# Patient Record
Sex: FEMALE | Race: WHITE | NOT HISPANIC OR LATINO | Employment: OTHER | URBAN - METROPOLITAN AREA
[De-identification: names, ages, dates, MRNs, and addresses within clinical notes are randomized per-mention and may not be internally consistent; named-entity substitution may affect disease eponyms.]

---

## 2018-05-09 ENCOUNTER — APPOINTMENT (OUTPATIENT)
Dept: CT IMAGING | Facility: CLINIC | Age: 63
End: 2018-05-09
Attending: EMERGENCY MEDICINE
Payer: COMMERCIAL

## 2018-05-09 ENCOUNTER — HOSPITAL ENCOUNTER (EMERGENCY)
Facility: CLINIC | Age: 63
Discharge: HOME | End: 2018-05-10
Attending: EMERGENCY MEDICINE | Admitting: EMERGENCY MEDICINE
Payer: COMMERCIAL

## 2018-05-09 DIAGNOSIS — T50.903A: ICD-10-CM

## 2018-05-09 DIAGNOSIS — S00.83XA CONTUSION OF OTHER PART OF HEAD, INITIAL ENCOUNTER: ICD-10-CM

## 2018-05-09 LAB
ANION GAP SERPL CALCULATED.3IONS-SCNC: 8 MMOL/L (ref 3–14)
BASOPHILS # BLD AUTO: 0 10E9/L (ref 0–0.2)
BASOPHILS NFR BLD AUTO: 0.4 %
BUN SERPL-MCNC: 22 MG/DL (ref 7–30)
CALCIUM SERPL-MCNC: 8.8 MG/DL (ref 8.5–10.1)
CHLORIDE SERPL-SCNC: 113 MMOL/L (ref 94–109)
CO2 SERPL-SCNC: 22 MMOL/L (ref 20–32)
CREAT SERPL-MCNC: 1.37 MG/DL (ref 0.52–1.04)
DIFFERENTIAL METHOD BLD: ABNORMAL
EOSINOPHIL # BLD AUTO: 0.2 10E9/L (ref 0–0.7)
EOSINOPHIL NFR BLD AUTO: 3.1 %
ERYTHROCYTE [DISTWIDTH] IN BLOOD BY AUTOMATED COUNT: 12.8 % (ref 10–15)
GFR SERPL CREATININE-BSD FRML MDRD: 39 ML/MIN/1.7M2
GLUCOSE BLDC GLUCOMTR-MCNC: 112 MG/DL (ref 70–99)
GLUCOSE SERPL-MCNC: 132 MG/DL (ref 70–99)
HCT VFR BLD AUTO: 36.8 % (ref 35–47)
HGB BLD-MCNC: 12.1 G/DL (ref 11.7–15.7)
IMM GRANULOCYTES # BLD: 0 10E9/L (ref 0–0.4)
IMM GRANULOCYTES NFR BLD: 0.4 %
INR PPP: 1.1 (ref 0.86–1.14)
LYMPHOCYTES # BLD AUTO: 2.4 10E9/L (ref 0.8–5.3)
LYMPHOCYTES NFR BLD AUTO: 32.4 %
MCH RBC QN AUTO: 33.2 PG (ref 26.5–33)
MCHC RBC AUTO-ENTMCNC: 32.9 G/DL (ref 31.5–36.5)
MCV RBC AUTO: 101 FL (ref 78–100)
MONOCYTES # BLD AUTO: 0.4 10E9/L (ref 0–1.3)
MONOCYTES NFR BLD AUTO: 5.7 %
NEUTROPHILS # BLD AUTO: 4.3 10E9/L (ref 1.6–8.3)
NEUTROPHILS NFR BLD AUTO: 58 %
NRBC # BLD AUTO: 0 10*3/UL
NRBC BLD AUTO-RTO: 0 /100
PLATELET # BLD AUTO: 213 10E9/L (ref 150–450)
POTASSIUM SERPL-SCNC: 3.7 MMOL/L (ref 3.4–5.3)
RBC # BLD AUTO: 3.65 10E12/L (ref 3.8–5.2)
SODIUM SERPL-SCNC: 143 MMOL/L (ref 133–144)
WBC # BLD AUTO: 7.4 10E9/L (ref 4–11)

## 2018-05-09 PROCEDURE — 70486 CT MAXILLOFACIAL W/O DYE: CPT

## 2018-05-09 PROCEDURE — 99285 EMERGENCY DEPT VISIT HI MDM: CPT | Mod: 25

## 2018-05-09 PROCEDURE — 70450 CT HEAD/BRAIN W/O DYE: CPT

## 2018-05-09 PROCEDURE — 25000132 ZZH RX MED GY IP 250 OP 250 PS 637: Performed by: EMERGENCY MEDICINE

## 2018-05-09 PROCEDURE — 85610 PROTHROMBIN TIME: CPT | Performed by: EMERGENCY MEDICINE

## 2018-05-09 PROCEDURE — 72125 CT NECK SPINE W/O DYE: CPT

## 2018-05-09 PROCEDURE — 93005 ELECTROCARDIOGRAM TRACING: CPT

## 2018-05-09 PROCEDURE — 80048 BASIC METABOLIC PNL TOTAL CA: CPT | Performed by: EMERGENCY MEDICINE

## 2018-05-09 PROCEDURE — 00000146 ZZHCL STATISTIC GLUCOSE BY METER IP

## 2018-05-09 PROCEDURE — 85025 COMPLETE CBC W/AUTO DIFF WBC: CPT | Performed by: EMERGENCY MEDICINE

## 2018-05-09 RX ORDER — VITS A,C,E/LUTEIN/MINERALS 300MCG-200
1 TABLET ORAL DAILY
COMMUNITY

## 2018-05-09 RX ORDER — OXYCODONE AND ACETAMINOPHEN 5; 325 MG/1; MG/1
1-2 TABLET ORAL EVERY 6 HOURS PRN
Status: ON HOLD | COMMUNITY
End: 2018-10-05

## 2018-05-09 RX ORDER — GLUCOSAMINE HCL 500 MG
3000 TABLET ORAL DAILY
COMMUNITY
End: 2018-09-28

## 2018-05-09 RX ORDER — OXYCODONE HYDROCHLORIDE 5 MG/1
5 TABLET ORAL ONCE
Status: COMPLETED | OUTPATIENT
Start: 2018-05-09 | End: 2018-05-09

## 2018-05-09 RX ORDER — PROMETHAZINE HYDROCHLORIDE 12.5 MG/1
25 TABLET ORAL EVERY 6 HOURS PRN
COMMUNITY
End: 2022-08-16

## 2018-05-09 RX ADMIN — OXYCODONE HYDROCHLORIDE 5 MG: 5 TABLET ORAL at 23:39

## 2018-05-09 ASSESSMENT — ENCOUNTER SYMPTOMS
VOMITING: 0
COUGH: 0
FEVER: 0
ARTHRALGIAS: 1
ABDOMINAL PAIN: 0
HEADACHES: 1
NAUSEA: 0
DIARRHEA: 0

## 2018-05-09 NOTE — ED AVS SNAPSHOT
[facility] Emergency Department    [address]    [facility] [address]    Phone:  [phone]    Fax:  [phone]                                       Tika Martinez   MRN: [id number]    Department:  [facility] Emergency Department   Date of Visit:  5/9/2018           After Visit Summary Signature Page     I have received my discharge instructions, and my questions have been answered. I have discussed any challenges I see with this plan with the nurse or doctor.    ..........................................................................................................................................  Patient/Patient Representative Signature      ..........................................................................................................................................  Patient Representative Print Name and Relationship to Patient    ..................................................               ................................................  Date                                            Time    ..........................................................................................................................................  Reviewed by Signature/Title    ...................................................              ..............................................  Date                                                            Time

## 2018-05-09 NOTE — ED AVS SNAPSHOT
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 and show to your community pharmacist(s) and doctor(s) at your next visit.

## 2018-05-10 ENCOUNTER — APPOINTMENT (OUTPATIENT)
Dept: GENERAL RADIOLOGY | Facility: CLINIC | Age: 63
End: 2018-05-10
Attending: EMERGENCY MEDICINE
Payer: COMMERCIAL

## 2018-05-10 VITALS
BODY MASS INDEX: 24.59 KG/M2 | WEIGHT: 153 LBS | HEIGHT: 66 IN | HEART RATE: 81 BPM | RESPIRATION RATE: 16 BRPM | SYSTOLIC BLOOD PRESSURE: 98 MMHG | OXYGEN SATURATION: 98 % | DIASTOLIC BLOOD PRESSURE: 65 MMHG | TEMPERATURE: 98 F

## 2018-05-10 LAB — INTERPRETATION ECG - MUSE: NORMAL

## 2018-05-10 PROCEDURE — 71046 X-RAY EXAM CHEST 2 VIEWS: CPT

## 2018-05-10 PROCEDURE — 25000132 ZZH RX MED GY IP 250 OP 250 PS 637: Performed by: EMERGENCY MEDICINE

## 2018-05-10 PROCEDURE — 73030 X-RAY EXAM OF SHOULDER: CPT | Mod: RT

## 2018-05-10 RX ORDER — METRONIDAZOLE 500 MG/1
500 TABLET ORAL ONCE
Qty: 1 TABLET | Refills: 0 | Status: SHIPPED | OUTPATIENT
Start: 2018-05-10 | End: 2018-05-10

## 2018-05-10 RX ORDER — OXYCODONE HYDROCHLORIDE 5 MG/1
5 TABLET ORAL ONCE
Status: COMPLETED | OUTPATIENT
Start: 2018-05-10 | End: 2018-05-10

## 2018-05-10 RX ADMIN — OXYCODONE HYDROCHLORIDE 5 MG: 5 TABLET ORAL at 00:57
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
## 2018-05-10 NOTE — ED NOTES
Spoke with Abhishek NAVARRO, who said it may be a while until they can come see pt due to staffing. They will call back and update as applicable.
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
## 2018-05-10 NOTE — ED NOTES
Spoke with Abhishek NAVARRO Supervisor who stated Nallely, RN will assess when available. No ETA given.

## 2018-09-13 ENCOUNTER — HOSPITAL ENCOUNTER (OUTPATIENT)
Dept: LAB | Facility: CLINIC | Age: 63
Discharge: HOME | End: 2018-09-13
Attending: ORTHOPAEDIC SURGERY | Admitting: ORTHOPAEDIC SURGERY
Payer: COMMERCIAL

## 2018-09-13 DIAGNOSIS — Z01.812 PRE-OPERATIVE LABORATORY EXAMINATION: ICD-10-CM

## 2018-09-13 LAB
MRSA DNA SPEC QL NAA+PROBE: NEGATIVE
SPECIMEN SOURCE: NORMAL

## 2018-09-13 PROCEDURE — 87641 MR-STAPH DNA AMP PROBE: CPT | Performed by: ORTHOPAEDIC SURGERY

## 2018-09-13 PROCEDURE — 40000829 ZZHCL STATISTIC STAPH AUREUS SUSCEPT SCREEN PCR: Performed by: ORTHOPAEDIC SURGERY

## 2018-09-13 PROCEDURE — 87640 STAPH A DNA AMP PROBE: CPT | Performed by: ORTHOPAEDIC SURGERY

## 2018-09-13 PROCEDURE — 40000830 ZZHCL STATISTIC STAPH AUREUS METH RESIST SCREEN PCR: Performed by: ORTHOPAEDIC SURGERY

## 2018-09-17 RX ORDER — CYANOCOBALAMIN 1000 UG/ML
1 INJECTION, SOLUTION INTRAMUSCULAR; SUBCUTANEOUS
COMMUNITY

## 2018-09-17 RX ORDER — EPINEPHRINE 0.15 MG/.3ML
0.15 INJECTION INTRAMUSCULAR PRN
COMMUNITY

## 2018-09-17 RX ORDER — HYDROXYCHLOROQUINE SULFATE 200 MG/1
400 TABLET, FILM COATED ORAL DAILY
COMMUNITY
End: 2022-08-16

## 2018-09-28 RX ORDER — BUDESONIDE 3 MG/1
9 CAPSULE, COATED PELLETS ORAL EVERY MORNING
COMMUNITY
End: 2022-08-16

## 2018-09-28 RX ORDER — LIDOCAINE 50 MG/G
OINTMENT TOPICAL 3 TIMES DAILY PRN
COMMUNITY
End: 2022-08-16

## 2018-09-28 RX ORDER — BUPROPION HYDROCHLORIDE 300 MG/1
450 TABLET ORAL EVERY EVENING
COMMUNITY

## 2018-09-28 RX ORDER — ALBUTEROL SULFATE 90 UG/1
2 AEROSOL, METERED RESPIRATORY (INHALATION) EVERY 4 HOURS PRN
COMMUNITY

## 2018-09-28 RX ORDER — ASCORBIC ACID 500 MG
500 TABLET ORAL DAILY
COMMUNITY

## 2018-09-28 RX ORDER — FERROUS SULFATE 324(65)MG
324 TABLET, DELAYED RELEASE (ENTERIC COATED) ORAL DAILY
COMMUNITY

## 2018-09-28 RX ORDER — LIDOCAINE 50 MG/G
1 PATCH TOPICAL EVERY 24 HOURS
COMMUNITY
End: 2022-08-16

## 2018-09-28 RX ORDER — MAGNESIUM OXIDE/MAG AA CHELATE 300 MG
1 CAPSULE ORAL DAILY
COMMUNITY
End: 2022-08-16

## 2018-09-28 RX ORDER — FLUOROURACIL 50 MG/G
CREAM TOPICAL 2 TIMES DAILY
COMMUNITY

## 2018-09-28 RX ORDER — MIDODRINE HYDROCHLORIDE 5 MG/1
5 TABLET ORAL 3 TIMES DAILY
COMMUNITY

## 2018-10-01 ENCOUNTER — HOSPITAL ENCOUNTER (OUTPATIENT)
Dept: LAB | Facility: CLINIC | Age: 63
Discharge: HOME | End: 2018-10-01
Attending: FAMILY MEDICINE | Admitting: ORTHOPAEDIC SURGERY
Payer: COMMERCIAL

## 2018-10-01 DIAGNOSIS — Z01.812 PRE-OPERATIVE LABORATORY EXAMINATION: ICD-10-CM

## 2018-10-01 LAB
ABO + RH BLD: NORMAL
ABO + RH BLD: NORMAL
BLD GP AB SCN SERPL QL: NORMAL
BLOOD BANK CMNT PATIENT-IMP: NORMAL
SPECIMEN EXP DATE BLD: NORMAL

## 2018-10-01 PROCEDURE — 36415 COLL VENOUS BLD VENIPUNCTURE: CPT | Performed by: ORTHOPAEDIC SURGERY

## 2018-10-01 PROCEDURE — 86850 RBC ANTIBODY SCREEN: CPT | Performed by: ORTHOPAEDIC SURGERY

## 2018-10-01 PROCEDURE — 86901 BLOOD TYPING SEROLOGIC RH(D): CPT | Performed by: ORTHOPAEDIC SURGERY

## 2018-10-01 PROCEDURE — 86900 BLOOD TYPING SEROLOGIC ABO: CPT | Performed by: ORTHOPAEDIC SURGERY

## 2018-10-02 ENCOUNTER — ANESTHESIA EVENT (OUTPATIENT)
Dept: SURGERY | Facility: CLINIC | Age: 63
End: 2018-10-02
Payer: COMMERCIAL

## 2018-10-02 ENCOUNTER — ANESTHESIA (OUTPATIENT)
Dept: SURGERY | Facility: CLINIC | Age: 63
End: 2018-10-02
Payer: COMMERCIAL

## 2018-10-02 ENCOUNTER — APPOINTMENT (OUTPATIENT)
Dept: PHYSICAL THERAPY | Facility: CLINIC | Age: 63
End: 2018-10-02
Attending: ORTHOPAEDIC SURGERY
Payer: COMMERCIAL

## 2018-10-02 ENCOUNTER — APPOINTMENT (OUTPATIENT)
Dept: GENERAL RADIOLOGY | Facility: CLINIC | Age: 63
End: 2018-10-02
Attending: ORTHOPAEDIC SURGERY
Payer: COMMERCIAL

## 2018-10-02 ENCOUNTER — HOSPITAL ENCOUNTER (INPATIENT)
Facility: CLINIC | Age: 63
LOS: 3 days | Discharge: HOME | End: 2018-10-05
Attending: ORTHOPAEDIC SURGERY | Admitting: ORTHOPAEDIC SURGERY
Payer: COMMERCIAL

## 2018-10-02 DIAGNOSIS — Z96.652 STATUS POST TOTAL LEFT KNEE REPLACEMENT: Primary | ICD-10-CM

## 2018-10-02 PROBLEM — Z96.659 TOTAL KNEE REPLACEMENT STATUS: Status: ACTIVE | Noted: 2018-10-02

## 2018-10-02 LAB
GLUCOSE BLDC GLUCOMTR-MCNC: 124 MG/DL (ref 70–99)
GLUCOSE BLDC GLUCOMTR-MCNC: 153 MG/DL (ref 70–99)
GLUCOSE BLDC GLUCOMTR-MCNC: 92 MG/DL (ref 70–99)
GLUCOSE BLDC GLUCOMTR-MCNC: 94 MG/DL (ref 70–99)
HBA1C MFR BLD: 5.6 % (ref 0–5.6)

## 2018-10-02 PROCEDURE — 99207 ZZC CONSULT E&M CHANGED TO INITIAL LEVEL: CPT | Performed by: INTERNAL MEDICINE

## 2018-10-02 PROCEDURE — 40000306 ZZH STATISTIC PRE PROC ASSESS II: Performed by: ORTHOPAEDIC SURGERY

## 2018-10-02 PROCEDURE — 40000193 ZZH STATISTIC PT WARD VISIT

## 2018-10-02 PROCEDURE — 83036 HEMOGLOBIN GLYCOSYLATED A1C: CPT | Performed by: INTERNAL MEDICINE

## 2018-10-02 PROCEDURE — C1776 JOINT DEVICE (IMPLANTABLE): HCPCS | Performed by: ORTHOPAEDIC SURGERY

## 2018-10-02 PROCEDURE — 71000013 ZZH RECOVERY PHASE 1 LEVEL 1 EA ADDTL HR: Performed by: ORTHOPAEDIC SURGERY

## 2018-10-02 PROCEDURE — 27210794 ZZH OR GENERAL SUPPLY STERILE: Performed by: ORTHOPAEDIC SURGERY

## 2018-10-02 PROCEDURE — 12000007 ZZH R&B INTERMEDIATE

## 2018-10-02 PROCEDURE — 25000125 ZZHC RX 250: Performed by: ANESTHESIOLOGY

## 2018-10-02 PROCEDURE — 99222 1ST HOSP IP/OBS MODERATE 55: CPT | Performed by: INTERNAL MEDICINE

## 2018-10-02 PROCEDURE — 37000009 ZZH ANESTHESIA TECHNICAL FEE, EACH ADDTL 15 MIN: Performed by: ORTHOPAEDIC SURGERY

## 2018-10-02 PROCEDURE — 25000125 ZZHC RX 250: Performed by: PHYSICIAN ASSISTANT

## 2018-10-02 PROCEDURE — 25800025 ZZH RX 258: Performed by: ORTHOPAEDIC SURGERY

## 2018-10-02 PROCEDURE — 25000128 H RX IP 250 OP 636: Performed by: ANESTHESIOLOGY

## 2018-10-02 PROCEDURE — 37000008 ZZH ANESTHESIA TECHNICAL FEE, 1ST 30 MIN: Performed by: ORTHOPAEDIC SURGERY

## 2018-10-02 PROCEDURE — 40000986 XR KNEE PORT LT 1/2 VW: Mod: LT

## 2018-10-02 PROCEDURE — 25000125 ZZHC RX 250: Performed by: ORTHOPAEDIC SURGERY

## 2018-10-02 PROCEDURE — 36000063 ZZH SURGERY LEVEL 4 EA 15 ADDTL MIN: Performed by: ORTHOPAEDIC SURGERY

## 2018-10-02 PROCEDURE — 25000128 H RX IP 250 OP 636: Performed by: PHYSICIAN ASSISTANT

## 2018-10-02 PROCEDURE — 27810169 ZZH OR IMPLANT GENERAL: Performed by: ORTHOPAEDIC SURGERY

## 2018-10-02 PROCEDURE — 25000132 ZZH RX MED GY IP 250 OP 250 PS 637: Performed by: ORTHOPAEDIC SURGERY

## 2018-10-02 PROCEDURE — 36415 COLL VENOUS BLD VENIPUNCTURE: CPT | Performed by: INTERNAL MEDICINE

## 2018-10-02 PROCEDURE — 25000128 H RX IP 250 OP 636: Performed by: NURSE ANESTHETIST, CERTIFIED REGISTERED

## 2018-10-02 PROCEDURE — 0SRD0J9 REPLACEMENT OF LEFT KNEE JOINT WITH SYNTHETIC SUBSTITUTE, CEMENTED, OPEN APPROACH: ICD-10-PCS | Performed by: ORTHOPAEDIC SURGERY

## 2018-10-02 PROCEDURE — 36000093 ZZH SURGERY LEVEL 4 1ST 30 MIN: Performed by: ORTHOPAEDIC SURGERY

## 2018-10-02 PROCEDURE — 97161 PT EVAL LOW COMPLEX 20 MIN: CPT | Mod: GP

## 2018-10-02 PROCEDURE — 00000146 ZZHCL STATISTIC GLUCOSE BY METER IP

## 2018-10-02 PROCEDURE — 27110028 ZZH OR GENERAL SUPPLY NON-STERILE: Performed by: ORTHOPAEDIC SURGERY

## 2018-10-02 PROCEDURE — 25000566 ZZH SEVOFLURANE, EA 15 MIN: Performed by: ORTHOPAEDIC SURGERY

## 2018-10-02 PROCEDURE — 25000132 ZZH RX MED GY IP 250 OP 250 PS 637: Performed by: INTERNAL MEDICINE

## 2018-10-02 PROCEDURE — 25000125 ZZHC RX 250: Performed by: NURSE ANESTHETIST, CERTIFIED REGISTERED

## 2018-10-02 PROCEDURE — 25000128 H RX IP 250 OP 636: Performed by: ORTHOPAEDIC SURGERY

## 2018-10-02 PROCEDURE — 71000012 ZZH RECOVERY PHASE 1 LEVEL 1 FIRST HR: Performed by: ORTHOPAEDIC SURGERY

## 2018-10-02 DEVICE — IMP INSERT BASEPLATE TIBIAL STRK TRI 2 5521-B-200: Type: IMPLANTABLE DEVICE | Site: KNEE | Status: FUNCTIONAL

## 2018-10-02 DEVICE — IMPLANTABLE DEVICE: Type: IMPLANTABLE DEVICE | Site: KNEE | Status: FUNCTIONAL

## 2018-10-02 DEVICE — BONE CEMENT SIMPLEX FULL DOSE 6191-1-001: Type: IMPLANTABLE DEVICE | Site: KNEE | Status: FUNCTIONAL

## 2018-10-02 DEVICE — BONE CEMENT SIMPLEX W/TOBRAMYCIN 6197-9-001: Type: IMPLANTABLE DEVICE | Site: KNEE | Status: FUNCTIONAL

## 2018-10-02 RX ORDER — PANTOPRAZOLE SODIUM 20 MG/1
20 TABLET, DELAYED RELEASE ORAL
Status: DISCONTINUED | OUTPATIENT
Start: 2018-10-03 | End: 2018-10-05 | Stop reason: HOSPADM

## 2018-10-02 RX ORDER — ONDANSETRON 2 MG/ML
4 INJECTION INTRAMUSCULAR; INTRAVENOUS EVERY 30 MIN PRN
Status: DISCONTINUED | OUTPATIENT
Start: 2018-10-02 | End: 2018-10-02 | Stop reason: HOSPADM

## 2018-10-02 RX ORDER — CEFAZOLIN SODIUM 1 G/3ML
1 INJECTION, POWDER, FOR SOLUTION INTRAMUSCULAR; INTRAVENOUS SEE ADMIN INSTRUCTIONS
Status: DISCONTINUED | OUTPATIENT
Start: 2018-10-02 | End: 2018-10-02 | Stop reason: HOSPADM

## 2018-10-02 RX ORDER — AMOXICILLIN 250 MG
1 CAPSULE ORAL 2 TIMES DAILY
Status: DISCONTINUED | OUTPATIENT
Start: 2018-10-02 | End: 2018-10-05 | Stop reason: HOSPADM

## 2018-10-02 RX ORDER — MONTELUKAST SODIUM 10 MG/1
10 TABLET ORAL AT BEDTIME
Status: DISCONTINUED | OUTPATIENT
Start: 2018-10-02 | End: 2018-10-05 | Stop reason: HOSPADM

## 2018-10-02 RX ORDER — MEPERIDINE HYDROCHLORIDE 50 MG/ML
12.5 INJECTION INTRAMUSCULAR; INTRAVENOUS; SUBCUTANEOUS
Status: DISCONTINUED | OUTPATIENT
Start: 2018-10-02 | End: 2018-10-02 | Stop reason: HOSPADM

## 2018-10-02 RX ORDER — BUDESONIDE 3 MG/1
9 CAPSULE, COATED PELLETS ORAL EVERY MORNING
Status: DISCONTINUED | OUTPATIENT
Start: 2018-10-03 | End: 2018-10-05 | Stop reason: HOSPADM

## 2018-10-02 RX ORDER — FENTANYL CITRATE 50 UG/ML
25-50 INJECTION, SOLUTION INTRAMUSCULAR; INTRAVENOUS
Status: DISCONTINUED | OUTPATIENT
Start: 2018-10-02 | End: 2018-10-02 | Stop reason: HOSPADM

## 2018-10-02 RX ORDER — OXYCODONE HYDROCHLORIDE 5 MG/1
5-10 TABLET ORAL
Status: DISCONTINUED | OUTPATIENT
Start: 2018-10-02 | End: 2018-10-04

## 2018-10-02 RX ORDER — DEXAMETHASONE SODIUM PHOSPHATE 4 MG/ML
INJECTION, SOLUTION INTRA-ARTICULAR; INTRALESIONAL; INTRAMUSCULAR; INTRAVENOUS; SOFT TISSUE PRN
Status: DISCONTINUED | OUTPATIENT
Start: 2018-10-02 | End: 2018-10-02

## 2018-10-02 RX ORDER — NICOTINE POLACRILEX 4 MG
15-30 LOZENGE BUCCAL
Status: DISCONTINUED | OUTPATIENT
Start: 2018-10-02 | End: 2018-10-05 | Stop reason: HOSPADM

## 2018-10-02 RX ORDER — MULTIPLE VITAMINS W/ MINERALS TAB 9MG-400MCG
1 TAB ORAL DAILY
Status: DISCONTINUED | OUTPATIENT
Start: 2018-10-02 | End: 2018-10-05 | Stop reason: HOSPADM

## 2018-10-02 RX ORDER — AMOXICILLIN 250 MG
2 CAPSULE ORAL 2 TIMES DAILY
Status: DISCONTINUED | OUTPATIENT
Start: 2018-10-02 | End: 2018-10-05 | Stop reason: HOSPADM

## 2018-10-02 RX ORDER — HYDROMORPHONE HYDROCHLORIDE 1 MG/ML
.3-.5 INJECTION, SOLUTION INTRAMUSCULAR; INTRAVENOUS; SUBCUTANEOUS EVERY 10 MIN PRN
Status: DISCONTINUED | OUTPATIENT
Start: 2018-10-02 | End: 2018-10-02 | Stop reason: HOSPADM

## 2018-10-02 RX ORDER — EPHEDRINE SULFATE 50 MG/ML
INJECTION, SOLUTION INTRAMUSCULAR; INTRAVENOUS; SUBCUTANEOUS PRN
Status: DISCONTINUED | OUTPATIENT
Start: 2018-10-02 | End: 2018-10-02

## 2018-10-02 RX ORDER — CEFAZOLIN SODIUM 2 G/100ML
2 INJECTION, SOLUTION INTRAVENOUS EVERY 8 HOURS
Status: COMPLETED | OUTPATIENT
Start: 2018-10-02 | End: 2018-10-03

## 2018-10-02 RX ORDER — FENTANYL CITRATE 50 UG/ML
INJECTION, SOLUTION INTRAMUSCULAR; INTRAVENOUS PRN
Status: DISCONTINUED | OUTPATIENT
Start: 2018-10-02 | End: 2018-10-02

## 2018-10-02 RX ORDER — ONDANSETRON 4 MG/1
4 TABLET, ORALLY DISINTEGRATING ORAL EVERY 30 MIN PRN
Status: DISCONTINUED | OUTPATIENT
Start: 2018-10-02 | End: 2018-10-02 | Stop reason: HOSPADM

## 2018-10-02 RX ORDER — ATORVASTATIN CALCIUM 40 MG/1
40 TABLET, FILM COATED ORAL AT BEDTIME
Status: DISCONTINUED | OUTPATIENT
Start: 2018-10-02 | End: 2018-10-05 | Stop reason: HOSPADM

## 2018-10-02 RX ORDER — GLYCOPYRROLATE 0.2 MG/ML
INJECTION, SOLUTION INTRAMUSCULAR; INTRAVENOUS PRN
Status: DISCONTINUED | OUTPATIENT
Start: 2018-10-02 | End: 2018-10-02

## 2018-10-02 RX ORDER — DEXTROSE MONOHYDRATE 25 G/50ML
25-50 INJECTION, SOLUTION INTRAVENOUS
Status: DISCONTINUED | OUTPATIENT
Start: 2018-10-02 | End: 2018-10-05 | Stop reason: HOSPADM

## 2018-10-02 RX ORDER — NALOXONE HYDROCHLORIDE 0.4 MG/ML
.1-.4 INJECTION, SOLUTION INTRAMUSCULAR; INTRAVENOUS; SUBCUTANEOUS
Status: DISCONTINUED | OUTPATIENT
Start: 2018-10-02 | End: 2018-10-02 | Stop reason: HOSPADM

## 2018-10-02 RX ORDER — MIRTAZAPINE 15 MG/1
45 TABLET, FILM COATED ORAL AT BEDTIME
Status: DISCONTINUED | OUTPATIENT
Start: 2018-10-02 | End: 2018-10-05 | Stop reason: HOSPADM

## 2018-10-02 RX ORDER — MIDODRINE HYDROCHLORIDE 5 MG/1
5 TABLET ORAL 3 TIMES DAILY
Status: DISCONTINUED | OUTPATIENT
Start: 2018-10-02 | End: 2018-10-05 | Stop reason: HOSPADM

## 2018-10-02 RX ORDER — SODIUM CHLORIDE, SODIUM LACTATE, POTASSIUM CHLORIDE, CALCIUM CHLORIDE 600; 310; 30; 20 MG/100ML; MG/100ML; MG/100ML; MG/100ML
INJECTION, SOLUTION INTRAVENOUS CONTINUOUS
Status: DISCONTINUED | OUTPATIENT
Start: 2018-10-02 | End: 2018-10-05 | Stop reason: HOSPADM

## 2018-10-02 RX ORDER — ACETAMINOPHEN 325 MG/1
975 TABLET ORAL EVERY 8 HOURS
Status: COMPLETED | OUTPATIENT
Start: 2018-10-02 | End: 2018-10-05

## 2018-10-02 RX ORDER — ALLOPURINOL 100 MG/1
200 TABLET ORAL AT BEDTIME
Status: DISCONTINUED | OUTPATIENT
Start: 2018-10-02 | End: 2018-10-05 | Stop reason: HOSPADM

## 2018-10-02 RX ORDER — PROPOFOL 10 MG/ML
INJECTION, EMULSION INTRAVENOUS CONTINUOUS PRN
Status: DISCONTINUED | OUTPATIENT
Start: 2018-10-02 | End: 2018-10-02

## 2018-10-02 RX ORDER — SCOLOPAMINE TRANSDERMAL SYSTEM 1 MG/1
1 PATCH, EXTENDED RELEASE TRANSDERMAL
Status: DISCONTINUED | OUTPATIENT
Start: 2018-10-02 | End: 2018-10-02 | Stop reason: HOSPADM

## 2018-10-02 RX ORDER — DEXTROSE MONOHYDRATE 25 G/50ML
25-50 INJECTION, SOLUTION INTRAVENOUS
Status: DISCONTINUED | OUTPATIENT
Start: 2018-10-02 | End: 2018-10-03

## 2018-10-02 RX ORDER — ONDANSETRON 2 MG/ML
4 INJECTION INTRAMUSCULAR; INTRAVENOUS EVERY 6 HOURS PRN
Status: DISCONTINUED | OUTPATIENT
Start: 2018-10-02 | End: 2018-10-05 | Stop reason: HOSPADM

## 2018-10-02 RX ORDER — ACETAMINOPHEN 325 MG/1
650 TABLET ORAL EVERY 4 HOURS PRN
Status: DISCONTINUED | OUTPATIENT
Start: 2018-10-05 | End: 2018-10-05 | Stop reason: HOSPADM

## 2018-10-02 RX ORDER — SODIUM CHLORIDE, SODIUM LACTATE, POTASSIUM CHLORIDE, CALCIUM CHLORIDE 600; 310; 30; 20 MG/100ML; MG/100ML; MG/100ML; MG/100ML
INJECTION, SOLUTION INTRAVENOUS CONTINUOUS
Status: DISCONTINUED | OUTPATIENT
Start: 2018-10-02 | End: 2018-10-02 | Stop reason: HOSPADM

## 2018-10-02 RX ORDER — ONDANSETRON 4 MG/1
4 TABLET, ORALLY DISINTEGRATING ORAL EVERY 6 HOURS PRN
Status: DISCONTINUED | OUTPATIENT
Start: 2018-10-02 | End: 2018-10-05 | Stop reason: HOSPADM

## 2018-10-02 RX ORDER — LORATADINE 10 MG/1
10 TABLET ORAL DAILY
Status: DISCONTINUED | OUTPATIENT
Start: 2018-10-02 | End: 2018-10-05 | Stop reason: HOSPADM

## 2018-10-02 RX ORDER — TOPIRAMATE 100 MG/1
200 TABLET, FILM COATED ORAL 2 TIMES DAILY
Status: DISCONTINUED | OUTPATIENT
Start: 2018-10-02 | End: 2018-10-05 | Stop reason: HOSPADM

## 2018-10-02 RX ORDER — ALBUTEROL SULFATE 0.83 MG/ML
2.5 SOLUTION RESPIRATORY (INHALATION)
Status: DISCONTINUED | OUTPATIENT
Start: 2018-10-02 | End: 2018-10-05 | Stop reason: HOSPADM

## 2018-10-02 RX ORDER — NICOTINE POLACRILEX 4 MG
15-30 LOZENGE BUCCAL
Status: DISCONTINUED | OUTPATIENT
Start: 2018-10-02 | End: 2018-10-03

## 2018-10-02 RX ORDER — BUPIVACAINE HYDROCHLORIDE AND EPINEPHRINE 2.5; 5 MG/ML; UG/ML
INJECTION, SOLUTION EPIDURAL; INFILTRATION; INTRACAUDAL; PERINEURAL PRN
Status: DISCONTINUED | OUTPATIENT
Start: 2018-10-02 | End: 2018-10-02 | Stop reason: HOSPADM

## 2018-10-02 RX ORDER — NALOXONE HYDROCHLORIDE 0.4 MG/ML
.1-.4 INJECTION, SOLUTION INTRAMUSCULAR; INTRAVENOUS; SUBCUTANEOUS
Status: DISCONTINUED | OUTPATIENT
Start: 2018-10-02 | End: 2018-10-05 | Stop reason: HOSPADM

## 2018-10-02 RX ORDER — LIDOCAINE 40 MG/G
CREAM TOPICAL
Status: DISCONTINUED | OUTPATIENT
Start: 2018-10-02 | End: 2018-10-05 | Stop reason: HOSPADM

## 2018-10-02 RX ORDER — DIMENHYDRINATE 50 MG/ML
25 INJECTION, SOLUTION INTRAMUSCULAR; INTRAVENOUS
Status: DISCONTINUED | OUTPATIENT
Start: 2018-10-02 | End: 2018-10-02 | Stop reason: HOSPADM

## 2018-10-02 RX ORDER — ALBUTEROL SULFATE 0.83 MG/ML
2.5 SOLUTION RESPIRATORY (INHALATION) EVERY 4 HOURS PRN
Status: DISCONTINUED | OUTPATIENT
Start: 2018-10-02 | End: 2018-10-02 | Stop reason: HOSPADM

## 2018-10-02 RX ORDER — ASCORBIC ACID 500 MG
500 TABLET ORAL DAILY
Status: DISCONTINUED | OUTPATIENT
Start: 2018-10-02 | End: 2018-10-05 | Stop reason: HOSPADM

## 2018-10-02 RX ORDER — ONDANSETRON 2 MG/ML
INJECTION INTRAMUSCULAR; INTRAVENOUS PRN
Status: DISCONTINUED | OUTPATIENT
Start: 2018-10-02 | End: 2018-10-02

## 2018-10-02 RX ORDER — CEFAZOLIN SODIUM 2 G/100ML
2 INJECTION, SOLUTION INTRAVENOUS
Status: COMPLETED | OUTPATIENT
Start: 2018-10-02 | End: 2018-10-02

## 2018-10-02 RX ORDER — BUPROPION HYDROCHLORIDE 150 MG/1
300 TABLET ORAL EVERY MORNING
Status: DISCONTINUED | OUTPATIENT
Start: 2018-10-03 | End: 2018-10-05 | Stop reason: HOSPADM

## 2018-10-02 RX ORDER — PROPOFOL 10 MG/ML
INJECTION, EMULSION INTRAVENOUS PRN
Status: DISCONTINUED | OUTPATIENT
Start: 2018-10-02 | End: 2018-10-02

## 2018-10-02 RX ORDER — HYDROMORPHONE HYDROCHLORIDE 1 MG/ML
.3-.5 INJECTION, SOLUTION INTRAMUSCULAR; INTRAVENOUS; SUBCUTANEOUS
Status: DISCONTINUED | OUTPATIENT
Start: 2018-10-02 | End: 2018-10-05 | Stop reason: HOSPADM

## 2018-10-02 RX ADMIN — FENTANYL CITRATE 25 MCG: 50 INJECTION INTRAMUSCULAR; INTRAVENOUS at 12:30

## 2018-10-02 RX ADMIN — SODIUM CHLORIDE, POTASSIUM CHLORIDE, SODIUM LACTATE AND CALCIUM CHLORIDE: 600; 310; 30; 20 INJECTION, SOLUTION INTRAVENOUS at 21:39

## 2018-10-02 RX ADMIN — ONDANSETRON 4 MG: 2 INJECTION INTRAMUSCULAR; INTRAVENOUS at 08:50

## 2018-10-02 RX ADMIN — MIRTAZAPINE 45 MG: 15 TABLET, FILM COATED ORAL at 21:38

## 2018-10-02 RX ADMIN — OXYCODONE HYDROCHLORIDE 10 MG: 5 TABLET ORAL at 18:14

## 2018-10-02 RX ADMIN — FENTANYL CITRATE 50 MCG: 50 INJECTION INTRAMUSCULAR; INTRAVENOUS at 09:45

## 2018-10-02 RX ADMIN — OXYCODONE HYDROCHLORIDE 5 MG: 5 TABLET ORAL at 15:22

## 2018-10-02 RX ADMIN — Medication 1 G: at 07:45

## 2018-10-02 RX ADMIN — FENTANYL CITRATE 50 MCG: 50 INJECTION INTRAMUSCULAR; INTRAVENOUS at 09:52

## 2018-10-02 RX ADMIN — Medication 0.5 MG: at 10:05

## 2018-10-02 RX ADMIN — FENTANYL CITRATE 150 MCG: 50 INJECTION, SOLUTION INTRAMUSCULAR; INTRAVENOUS at 07:32

## 2018-10-02 RX ADMIN — Medication 0.5 MG: at 16:23

## 2018-10-02 RX ADMIN — PROPOFOL 150 MG: 10 INJECTION, EMULSION INTRAVENOUS at 07:32

## 2018-10-02 RX ADMIN — SENNOSIDES AND DOCUSATE SODIUM 1 TABLET: 8.6; 5 TABLET ORAL at 20:17

## 2018-10-02 RX ADMIN — FENTANYL CITRATE 50 MCG: 50 INJECTION, SOLUTION INTRAMUSCULAR; INTRAVENOUS at 08:13

## 2018-10-02 RX ADMIN — DEXAMETHASONE SODIUM PHOSPHATE 4 MG: 4 INJECTION, SOLUTION INTRA-ARTICULAR; INTRALESIONAL; INTRAMUSCULAR; INTRAVENOUS; SOFT TISSUE at 07:32

## 2018-10-02 RX ADMIN — SODIUM CHLORIDE, POTASSIUM CHLORIDE, SODIUM LACTATE AND CALCIUM CHLORIDE: 600; 310; 30; 20 INJECTION, SOLUTION INTRAVENOUS at 13:14

## 2018-10-02 RX ADMIN — FENTANYL CITRATE 50 MCG: 50 INJECTION INTRAMUSCULAR; INTRAVENOUS at 10:42

## 2018-10-02 RX ADMIN — FENTANYL CITRATE 50 MCG: 50 INJECTION, SOLUTION INTRAMUSCULAR; INTRAVENOUS at 09:25

## 2018-10-02 RX ADMIN — SODIUM CHLORIDE, POTASSIUM CHLORIDE, SODIUM LACTATE AND CALCIUM CHLORIDE: 600; 310; 30; 20 INJECTION, SOLUTION INTRAVENOUS at 08:40

## 2018-10-02 RX ADMIN — Medication 0.5 MG: at 11:18

## 2018-10-02 RX ADMIN — Medication 0.5 MG: at 11:42

## 2018-10-02 RX ADMIN — MIDODRINE HYDROCHLORIDE 5 MG: 5 TABLET ORAL at 18:30

## 2018-10-02 RX ADMIN — TOPIRAMATE 200 MG: 100 TABLET, FILM COATED ORAL at 20:17

## 2018-10-02 RX ADMIN — ACETAMINOPHEN 650 MG: 325 TABLET, FILM COATED ORAL at 12:30

## 2018-10-02 RX ADMIN — GLYCOPYRROLATE 0.2 MG: 0.2 INJECTION, SOLUTION INTRAMUSCULAR; INTRAVENOUS at 07:32

## 2018-10-02 RX ADMIN — SODIUM CHLORIDE, POTASSIUM CHLORIDE, SODIUM LACTATE AND CALCIUM CHLORIDE: 600; 310; 30; 20 INJECTION, SOLUTION INTRAVENOUS at 06:12

## 2018-10-02 RX ADMIN — OXYCODONE HYDROCHLORIDE 5 MG: 5 TABLET ORAL at 12:30

## 2018-10-02 RX ADMIN — CEFAZOLIN SODIUM 2 G: 2 INJECTION, SOLUTION INTRAVENOUS at 07:34

## 2018-10-02 RX ADMIN — OXYCODONE HYDROCHLORIDE 10 MG: 5 TABLET ORAL at 21:38

## 2018-10-02 RX ADMIN — PROPOFOL 50 MCG/KG/MIN: 10 INJECTION, EMULSION INTRAVENOUS at 07:40

## 2018-10-02 RX ADMIN — Medication 5 MG: at 07:40

## 2018-10-02 RX ADMIN — ACETAMINOPHEN 975 MG: 325 TABLET, FILM COATED ORAL at 21:38

## 2018-10-02 RX ADMIN — FENTANYL CITRATE 50 MCG: 50 INJECTION INTRAMUSCULAR; INTRAVENOUS at 10:23

## 2018-10-02 RX ADMIN — HYDROMORPHONE HYDROCHLORIDE 1 MG: 1 INJECTION, SOLUTION INTRAMUSCULAR; INTRAVENOUS; SUBCUTANEOUS at 07:54

## 2018-10-02 RX ADMIN — ALLOPURINOL 200 MG: 100 TABLET ORAL at 21:38

## 2018-10-02 RX ADMIN — ATORVASTATIN CALCIUM 40 MG: 40 TABLET, FILM COATED ORAL at 21:38

## 2018-10-02 RX ADMIN — Medication 0.5 MG: at 10:53

## 2018-10-02 RX ADMIN — MIDAZOLAM 2 MG: 1 INJECTION INTRAMUSCULAR; INTRAVENOUS at 07:17

## 2018-10-02 RX ADMIN — CEFAZOLIN SODIUM 2 G: 2 INJECTION, SOLUTION INTRAVENOUS at 16:23

## 2018-10-02 RX ADMIN — MONTELUKAST SODIUM 10 MG: 10 TABLET, FILM COATED ORAL at 21:38

## 2018-10-02 RX ADMIN — SCOPALAMINE 1 PATCH: 1 PATCH, EXTENDED RELEASE TRANSDERMAL at 07:12

## 2018-10-02 RX ADMIN — Medication 0.5 MG: at 20:17

## 2018-10-02 ASSESSMENT — ACTIVITIES OF DAILY LIVING (ADL)
AMBULATION: 1-->ASSISTIVE EQUIPMENT
ADLS_ACUITY_SCORE: 13
RETIRED_EATING: 0-->INDEPENDENT
RETIRED_COMMUNICATION: 0-->UNDERSTANDS/COMMUNICATES WITHOUT DIFFICULTY
WHICH_OF_THE_ABOVE_FUNCTIONAL_RISKS_HAD_A_RECENT_ONSET_OR_CHANGE?: AMBULATION;TRANSFERRING
ADLS_ACUITY_SCORE: 13
TRANSFERRING: 1-->ASSISTIVE EQUIPMENT
NUMBER_OF_TIMES_PATIENT_HAS_FALLEN_WITHIN_LAST_SIX_MONTHS: 1
COGNITION: 0 - NO COGNITION ISSUES REPORTED
DRESS: 0-->INDEPENDENT
SWALLOWING: 0-->SWALLOWS FOODS/LIQUIDS WITHOUT DIFFICULTY
FALL_HISTORY_WITHIN_LAST_SIX_MONTHS: YES
TOILETING: 1-->ASSISTIVE EQUIPMENT
BATHING: 1-->ASSISTIVE EQUIPMENT

## 2018-10-02 NOTE — IP AVS SNAPSHOT
[facility]    [address] [facility] [address]    Phone:  [phone]    Fax:  [phone]                                       After Visit Summary   10/2/2018    Tika Martinez    MRN: [id number]           After Visit Summary Signature Page     I have received my discharge instructions, and my questions have been answered. I have discussed any challenges I see with this plan with the nurse or doctor.    ..........................................................................................................................................  Patient/Patient Representative Signature      ..........................................................................................................................................  Patient Representative Print Name and Relationship to Patient    ..................................................               ................................................  Date                                   Time    ..........................................................................................................................................  Reviewed by Signature/Title    ...................................................              ..............................................  Date                                               Time          22EPIC Rev 08/18

## 2018-10-02 NOTE — ANESTHESIA POSTPROCEDURE EVALUATION
Patient: Tika Martinez    Procedure(s):  left total knee arthroplasty    choice anesthesia - Wound Class: I-Clean    Diagnosis:DJD  Diagnosis Additional Information: No value filed.    Anesthesia Type:  General    Note:  Anesthesia Post Evaluation    Patient location during evaluation: PACU  Patient participation: Able to fully participate in evaluation  Level of consciousness: awake  Pain management: adequate  Airway patency: patent  Cardiovascular status: acceptable  Respiratory status: acceptable  Hydration status: acceptable  PONV: controlled     Anesthetic complications: None          Last vitals:  Vitals:    10/02/18 1005 10/02/18 1010 10/02/18 1023   BP: 140/80 138/73    Resp: 10 (!) 6 12   Temp:      SpO2: 100% 100% 100%         Electronically Signed By: Germain De La O DO  October 2, 2018  10:43 AM

## 2018-10-02 NOTE — ANESTHESIA CARE TRANSFER NOTE
Patient: Tika Martinez    Procedure(s):  left total knee arthroplasty    choice anesthesia - Wound Class: I-Clean    Diagnosis: DJD  Diagnosis Additional Information: No value filed.    Anesthesia Type:   General     Note:  Airway :Face Mask  Patient transferred to:PACU  Comments: Pt spon resp, follows commands, VSS, LMA removed without complications. Pt tx to PACU, VSS, pt comfortable. Report given to  PACU RN.Handoff Report: Identifed the Patient, Identified the Reponsible Provider, Reviewed the pertinent medical history, Discussed the surgical course, Reviewed Intra-OP anesthesia mangement and issues during anesthesia, Set expectations for post-procedure period and Allowed opportunity for questions and acknowledgement of understanding      Vitals: (Last set prior to Anesthesia Care Transfer)    CRNA VITALS  10/2/2018 0858 - 10/2/2018 0934      10/2/2018             Pulse: 117    SpO2: 99 %    Resp Rate (observed): (!)  34                Electronically Signed By: GERMAN Ashley CRNA  October 2, 2018  9:34 AM

## 2018-10-02 NOTE — ANESTHESIA PREPROCEDURE EVALUATION
PAC NOTE:       ANESTHESIA PRE EVALUATION:  Anesthesia Evaluation     .             ROS/MED HX    ENT/Pulmonary:     (+)asthma , . .    Neurologic:  - neg neurologic ROS     Cardiovascular: Comment: Long QT - neg cardiovascular ROS       METS/Exercise Tolerance:     Hematologic:  - neg hematologic  ROS       Musculoskeletal:  - neg musculoskeletal ROS       GI/Hepatic:     (+) Other GI/Hepatic (Bariatric sx)       Renal/Genitourinary:  - ROS Renal section negative   (+) chronic renal disease, type: CRI,       Endo: Comment: Insulin pump    (+) type II DM .      Psychiatric:  - neg psychiatric ROS       Infectious Disease:  - neg infectious disease ROS       Malignancy:         Other: Comment: .Lab Test        05/09/18 03/20/16                       2315 1953          WBC          7.4          7.1           HGB          12.1         11.2*         MCV          101*         99            PLT          213          293           INR          1.10         0.90           Lab Test        05/09/18 03/20/16 2315 1953          NA           143          144           POTASSIUM    3.7          3.9           CHLORIDE     113*         115*          CO2          22           24            BUN          22           13            CR           1.37*        1.27*         ANIONGAP     8            5             KEMAL          8.8          8.3*          GLC          132*         115*                              Physical Exam  Normal systems: cardiovascular and pulmonary    Airway   Mallampati: II    Dental     Cardiovascular   Rhythm and rate: regular and normal      Pulmonary    breath sounds clear to auscultation             Anesthesia Plan      History & Physical Review  History and physical reviewed and following examination; no interval change.    ASA Status:  3 .        Plan for General and Periph. Nerve Block for postop pain with Intravenous induction. Maintenance will be Inhalation  and Balanced.    PONV prophylaxis:  Ondansetron (or other 5HT-3), Scopolamine patch and Dexamethasone or Solumedrol       Postoperative Care  Postoperative pain management:  IV analgesics, Oral pain medications and Multi-modal analgesia.      Consents  Anesthetic plan, risks, benefits and alternatives discussed with:  Patient or representative..                            .

## 2018-10-02 NOTE — ANESTHESIA PROCEDURE NOTES
Peripheral nerve/Neuraxial procedure note : Femoral  Pre-Procedure  Performed by JERRICA DE LA O  Referred by MD CHILANGO  Location: pre-op    Procedure Times:10/2/2018 7:16 AM and 10/2/2018 7:27 AM  Pre-Anesthestic Checklist: patient identified, IV checked, site marked, risks and benefits discussed, informed consent, monitors and equipment checked, pre-op evaluation, at physician/surgeon's request and post-op pain management    Timeout  Correct Patient: Yes   Correct Procedure: Yes   Correct Site: Yes   Correct Laterality: Yes   Correct Position: Yes   Site Marked: Yes   .   Procedure Documentation    .    Procedure:  left  Femoral.     Ultrasound used to identify targeted nerve, plexus, or vascular marker and placed a needle adjacent to it., Ultrasound was used to visualize the spread of the anesthetic in close proximity to the above stated nerve.   Patient Prep;mask, sterile gloves, chlorhexidine gluconate and isopropyl alcohol.  Nerve Stim: Initial Level 0.6 mA. Lowest motor response mA..  Needle: insulated  Needle Length (Inches) 3.13  Insertion Method: Single Shot.       Assessment/Narrative  Paresthesias: No.  .  The placement was negative for: blood aspirated, painful injection and site bleeding.  Bolus given via needle..   Secured via.   Complications: none. Comments:  .Femoral Nerve Block    Medication- Bupivicaine 0.75% 24cc + Lido 2% w/ epi 1:200k 4cc    .The surgeon has given a verbal order transferring care of this patient to me for the performance of a regional analgesia block for post-op pain control. It is requested of me because I am uniquely trained and qualified to perform this block and the surgeon is neither trained nor qualified to perform this procedure.      TANISHA De La O

## 2018-10-02 NOTE — PLAN OF CARE
Problem: Knee Arthroplasty (Total, Partial) (Adult)  Goal: Signs and Symptoms of Listed Potential Problems Will be Absent, Minimized or Managed (Knee Arthroplasty)  Signs and symptoms of listed potential problems will be absent, minimized or managed by discharge/transition of care (reference Knee Arthroplasty (Total, Partial) (Adult) CPG).   Outcome: Improving  Pt arrived from pacu on a cart, assisted into bed with air francis. IV infusing into left f/a, drain in left knee, O2 on at 2L NC, capnography on.Pt has insulin pump on her left arm.  did bring her meter for testing. Pt rated her pain a 4-5 on arrival, with a goal of 5. Frequent VS started, oriented to room, equipment , orders and floor routines. Informed she would have PT at 1600. Pt had gotten on commode in pacu and voided. Has neuropathy on the bottoms of her feet, o/w cms is intact.

## 2018-10-02 NOTE — PROGRESS NOTES
"SPIRITUAL HEALTH SERVICES Progress Note  [facility] pre-surgical visit as requested by patient.  Patient recounted several prior surgeries, some with undesired outcomes.  One she shared was due to an error; another she experienced a great deal of pain so is requesting to \"be out\" for this surgery.  Patient also expressed hope for relief from pain and reliance on pain medication through this surgery.  Although patient was unclear at first what might help her going into surgery, talking about her past experience and what she hopes for through this surgical procedure, provided time to reflect and brought her to request prayer.   is presenting and supportive as well.  Also introduced our services as needed/desired post surgery.      Audery SIMONS) [place]  Staff   On-call  [phone]    "

## 2018-10-02 NOTE — IP AVS SNAPSHOT
MRN:[id number]                      After Visit Summary   10/2/2018    Tika Martinez    MRN: [id number]           Thank you!     Thank you for choosing [facility] for your care. Our goal is always to provide you with excellent care. Hearing back from our patients is one way we can continue to improve our services. Please take a few minutes to complete the written survey that you may receive in the mail after you visit. If you would like to speak to someone directly about your visit please contact Patient Relations at [phone]. Thank you!          Patient Information     Date Of Birth          1955        Designated Caregiver       Most Recent Value    Caregiver    Will someone help with your care after discharge? yes    Name of designated caregiver Bernabe Martinez    Phone number of caregiver     Caregiver address Same      About your hospital stay     You were admitted on:  October 2, 2018 You last received care in the:  [facility]    You were discharged on:  October 5, 2018        Reason for your hospital stay       Left total knee replacement.                  Who to Call     For medical emergencies, please call 911.  For non-urgent questions about your medical care, please call your primary care provider or clinic, None  For questions related to your surgery, please call your surgery clinic        Attending Provider     Provider Kai Paredes MD Orthopedics       Primary Care Provider    Christ Wiggins      After Care Instructions     Activity       Your activity upon discharge: activity as tolerated. Use your walker for ambulation.            Diet       Follow this diet upon discharge: Regular            Wound care and dressings       Instructions to care for your wound at home: keep wound clean and dry. Keep dressing in place until follow-up appointment.                  Follow-up Appointments     Follow-up and recommended  labs and tests        Follow-up with Miguel Estrella PA-C in 2 weeks.                  Further instructions from your care team       Return to clinic in 10-14 days.  Call [phone] to schedule or if you experience any problems before your scheduled appointment.      1. Do exercises at home as instructed by therapist twice a day. Continue outpatient therapy as ordered by your doctor.  2. Ice knee after exercises and therapy.  3. Examine dressing daily for problems.  4. May shower, can get dressing wet, no soaking (no bathtubs, pools or hot tubs)  5. Notify your dentist or physician of your implant so you can get antibiotics before any dental work or before any invasive procedure (ie: colonoscopy)  6. Remove anti-embolism stockings (Duncan hose) for 30 minutes twice daily.      Aquacel dressing:  DO NOT CHANGE DRESSING DAILY.  Leave this dressing on until follow-up appointment with Orthopedic Surgeon.  Dressing is waterproof, can shower with it on, pat dry when done.  No soaking such as in tub baths, pools or hot tubs        While on narcotic pain medication, to prevent constipation:  1. Drink plenty of water to keep well hydrated   2. May take over the counter Colace or Senna (follow instructions on label)        Call your physician if: ([phone])   1. Persistent fever greater than 101 degrees with body chills or excessive sweating.  2. Increased/persistent redness, localized warmth, tenderness, drainage or swelling at dressing site. Greater than 50% drainage on dressing.   3. Persistent pain not controlled with oral pain medications, ice and rest.   4. No bowel movement in 3 days (may use Milk of Magnesia or other over the counter remedy).  5. Chest pain, shortness of breath, and/or calf pain with excessive swelling.  6. Generalized feeling of illness such as nausea/vomitng and/or lightheaded/dizziness  7. Any other questions or concerns related to your surgery/recovery.    Thank you for allowing [facility] to participate in your cares!!    Opioid Medication Information    You have been given a prescription for an opioid (narcotic) pain medicine and/or have received a pain medicine. These medicines can make you drowsy or impaired. You must not drive, operate dangerous equipment, or engage in any other dangerous activities while taking these medications. If you drive while taking these medications, you could be arrested for DUI, or driving under the influence. Do not drink any alcohol while you are taking these medications.   Opioid pain medications can cause addiction. If you have a history of chemical dependency of any type, you are at a higher risk of becoming addicted to pain medications.  Only take these prescribed medications to treat your pain when all other options have been tried. Take it for as short a time and as few doses as possible. Store your pain pills in a secure place, as they are frequently stolen and provide a dangerous opportunity for children or visitors in your house to start abusing these powerful medications. We will not replace any lost or stolen medicine.  As soon as your pain is better, you should seek out a drug take back program (see your local police department) to dispose of them.   Over-the-counter medications and prescription drugs can pollute cohen and be harmful to humans, fish, and other wildlife when disposed of improperly -- do not flush medications down the toilet or place in the trash.  Properly disposing of medicines is important to prevent abuse or poisoning and protect the environment.     Prescription and over-the-counter medications are collected anonymously from residents for free at [facility] drop-off locations. Visit the [facility]'s Office Prescription Drug Drop-Off page for the list of drop-off sites and information about the program.       [place]  Police Department [phone] Mon-Fri  8am to 4:30pm     [place]  Police Department  [phone] 24hrs a day    [place]  Police Department [phone] Mon-Fri  8am to 6:00pm     [place]  Police Department [phone] Mon-Fri  8am to 4:30pm     [place]  Police Department [phone] 24hrs a day      [place]  Police Department [phone] Mon-Fri  8am to 4:30pm   Many prescription pain medications contain Tylenol  (acetaminophen), including Vicodin , Tylenol #3 , Norco , Lortab , and Percocet .  You should not take any extra pills of Tylenol  if you are using these prescription medications or you can get very sick.  Do not ever take more than 3000 mg of acetaminophen in any 24 hour period.  All opioids tend to cause constipation. Drink plenty of water and eat foods that have a lot of fiber, such as fruits, vegetables, prune juice, apple juice and high fiber cereal.  Take a laxative if you don t move your bowels at least every other day. Miralax , Milk of Magnesia, Colace , or Senna  can be used to keep you regular.  You will likely need to continue stool softeners and stimulants while taking opioids.     Understanding Opioid Medicines for Pain Management  Opioids are medicines that can help reduce pain. They are stronger than most over-the-counter pain relievers and must be prescribed by a healthcare provider. They can be used to treat both acute and chronic pain that ranges from moderate to severe. While opioids can be safe and effective when used correctly, they do come with serious risks and side effects. For this reason, they should only be considered as an option if other medicines or treatments have not done enough to relieve or manage pain.   What is pain?  Pain is your body s way of telling you something is wrong. It makes you pull your hand away from a flame or avoid walking on an injured leg. Pain starts in receptor cells found beneath the skin and in organs throughout the body. When you are sick or injured, these receptor cells send signals along nerve pathways  to the spinal cord, which then send the signals to the brain. The brain interprets the signals as pain. In response, it sends back signals to protect the body. The brain also releases its own natural painkillers called endorphins to help reduce pain. Once the source of the pain heals, the pain usually goes away.   Types of pain  Pain can one be of two types: acute or chronic. Both types respond to treatment.    Acute pain typically lasts fewer than 3 months. It goes away when the cause is treated. Common causes of acute pain include injury or illness. Surgery can lead to short-term pain during healing. And women experience acute pain during and after childbirth. In some cases, acute pain can lead to chronic pain over time.    Chronic pain typically lasts longer than 3 months. This includes pain that comes and goes or that is continuous. Chronic pain may be due to an ongoing health problem, such as arthritis. Or it may linger after an injury that has healed, such as a broken bone. Problems with the body s pain-control system may also lead to chronic pain. Sometimes, chronic pain can occur with no clear cause.   The pain cycle  Pain can affect all aspects of your life--for example, sleep, mood, activity, and energy level are all affected by pain. Being tired, depressed, and inactive make the pain worse and harder to cope with. This leads to a cycle of pain.  How opioids work  Opioids work by attaching to special receptors found in the brain, spinal cord, and other organs. When opioids attach to these receptors, they can block or suppress how you feel pain. Opioids can also make you feel good or relaxed by affecting areas of the brain that produce feelings of pleasure.   Types of opioids  There are two types of opioids: short-acting/immediate-release (SA/IR) and long-acting/extended-release (LA/ER). Short-acting opioids work faster than long-acting opioids but give pain relief for only short periods. Long-acting  opioids work slower than short-acting opioids but give pain-relief for longer periods. Many current opioids come in both short- and long-acting formulas. Some examples of opioids include:    Codeine with acetaminophen    Fentanyl    Hydrocodone (with or without acetaminophen)    Hydromorphone    Meperidine    Methadone    Morphine    Oxycodone (with or without acetaminophen)    Oxycodone with naloxone    Tramadol  If you are prescribed opioids, you will usually be started on a short-acting type at the lowest dosage. The dosage may then be adjusted as needed based on your response to the medicine and further follow-up with your healthcare provider. If appropriate, you may transition to using a long-acting type opioid. In some cases, you may be prescribed both types of opioids to help manage different types of pain. Any changes or adjustments will also depend on your level of pain tolerance and tolerance of side effects.     Studies show that opioids provide short-term benefits for moderate to severe pain. But the benefits of long-term use of opioids for treating pain remain unclear. In general, you should only remain on opioids if they continue to improve pain and function without increasing the risks to your health.   How opioids are given  Most opioids are taken by mouth. They often come in pill form, but some may come in the form of liquids and even sweetened lozenges. Certain opioids also may be injected under the skin, into a muscle, or into a vein. Or they may be absorbed through the skin via a patch.     Know your options  Keep in mind that opioids are not the only option for treating pain. Non-opioid options may work just as well and have fewer risks and side effects. Non-opioid options can include:     Other pain relievers, such as acetaminophen or nonsteroidal anti-inflammatory drugs (NSAIDs) such as ibuprofen or naproxen    Other classes of medicines such as anticonvulsants, antidepressants, and muscle  "relaxers     Exercise and physical therapy     Cognitive behavioral therapy, which can help you learn different ways to respond and cope with pain     Mind/body therapies such as deep breathing, distraction, visualization, meditation, or biofeedback     Complementary therapies such as massage, acupuncture and acupressure, or chiropractic care     Various procedures, such as transcutaneous electrical nerve stimulation (TENS), implantation of a spinal pump, and nerve ablation    Don't take opioids in combination with benzodiazepines. Serious risks are associated with combining opioids with benzodiazepines. These risks include extreme sleepiness, slowed breathing, and death. Let your healthcare provider know if you are taking benzodiazepines.   Date Last Reviewed: 8/1/2017 2000-2017 [employer] [address] [place] [address], [place] [address]. All rights reserved. This information is not intended as a substitute for professional medical care. Always follow your healthcare professional's instructions.                Pending Results     No orders found from 9/30/2018 to 10/3/2018.            Statement of Approval     Ordered          10/05/18 0922  I have reviewed and agree with all the recommendations and orders detailed in this document.  EFFECTIVE NOW     Approved and electronically signed by:  Jeffery Estrella PA             Admission Information     Date & Time Provider Department Dept. Phone    10/2/2018 Kai Miranda MD [facility] [phone]      Your Vitals Were     Blood Pressure Pulse Temperature Respirations Height Weight    132/59 75 98.7  F (37.1  C) 16 1.676 m (5' 6\") 70.8 kg (156 lb)    Pulse Oximetry BMI (Body Mass Index)                92% 25.18 kg/m2          Care EveryWhere ID     This is your Care EveryWhere ID. This could be used by other organizations to access your [place] medical records  [id number]        Equal Access to Services     SHABNAM SHAH AH: " Hadii armando barrono Sopeytonali, waaxda luqadaha, qaybta kaalmada seferino, lennox juan reynamihir lucasnguyen zackarylakeshia la'shainamihir bijan. [facility] [phone].    ATENCIÓN: Si evelia novoa, tiene a richard disposición servicios gratuitos de asistencia lingüística. Llame al [phone].    We comply with applicable federal civil rights laws and [place] laws. We do not discriminate on the basis of race, color, national origin, age, disability, sex, sexual orientation, or gender identity.               Review of your medicines      START taking        Dose / Directions    acetaminophen 325 MG tablet   Commonly known as:  TYLENOL        Dose:  650 mg   Take 2 tablets (650 mg) by mouth every 4 hours as needed for other (multimodal surgical pain management along with NSAIDS and opioid medication as indicated based on pain control and physical function.)   Quantity:  100 tablet   Refills:  1       oxyCODONE IR 10 MG tablet   Commonly known as:  ROXICODONE        Dose:  10-15 mg   Take 1-1.5 tablets (10-15 mg) by mouth every 3 hours as needed   Quantity:  60 tablet   Refills:  0       rivaroxaban ANTICOAGULANT 10 MG Tabs tablet   Commonly known as:  XARELTO        Dose:  10 mg   Start taking on:  10/6/2018   Take 1 tablet (10 mg) by mouth daily for 14 days   Quantity:  14 tablet   Refills:  0         CONTINUE these medicines which have NOT CHANGED        Dose / Directions    ALLOPURINOL PO        Dose:  200 mg   Take 200 mg by mouth At Bedtime   Refills:  0       ascorbic acid 500 MG tablet   Commonly known as:  VITAMIN C        Dose:  500 mg   Take 500 mg by mouth daily   Refills:  0       ATORVASTATIN CALCIUM PO        Dose:  40 mg   Take 40 mg by mouth daily   Refills:  0       budesonide 3 MG EC capsule   Commonly known as:  ENTOCORT EC        Dose:  9 mg   Take 9 mg by mouth every morning   Refills:  0       buPROPion 300 MG 24 hr tablet   Commonly known as:  WELLBUTRIN XL        Dose:  300 mg   Take 300 mg by mouth every morning    Refills:  0       calcium carb-cholecalciferol 600-500 MG-UNIT Caps   Notes to Patient:  Home schedule        Dose:  1 capsule   Take 1 capsule by mouth daily   Refills:  0       * CENTRUM SILVER PO   Notes to Patient:  Home schedule        Dose:  1 tablet   Take 1 tablet by mouth daily   Refills:  0       * multivitamin Tabs tablet        Dose:  1 tablet   Take 1 tablet by mouth daily   Refills:  0       CLARITIN PO        Dose:  10 mg   Take 10 mg by mouth daily   Refills:  0       COZAAR PO   Notes to Patient:  Home schedule        Dose:  25 mg   Take 25 mg by mouth daily   Refills:  0       cyanocobalamin 1000 MCG/ML injection   Commonly known as:  VITAMIN B12   Notes to Patient:  Home schedule        Dose:  1 mL   Inject 1 mL into the muscle every 30 days   Refills:  0       EPINEPHrine 0.15 MG/0.3ML injection 2-pack   Commonly known as:  EPIPEN JR        Dose:  0.15 mg   Inject 0.15 mg into the muscle as needed for anaphylaxis   Refills:  0       Ferrous Sulfate 324 (65 Fe) MG Tbec   Notes to Patient:  Home schedule        Dose:  324 mg   Take 324 mg by mouth daily   Refills:  0       fluorouracil 5 % cream   Commonly known as:  EFUDEX   Notes to Patient:  Home schedule        Apply topically 2 times daily   Refills:  0       glucagon 1 MG kit        Dose:  1 mg   Inject 1 mg into the muscle once   Refills:  0       HYDROMORPHONE HCL PO        Dose:  4 mg   Take 4 mg by mouth every 8 hours as needed   Refills:  0       hydroxychloroquine 200 MG tablet   Commonly known as:  PLAQUENIL   Notes to Patient:  Home schedule        Dose:  400 mg   Take 400 mg by mouth daily   Refills:  0       insulin pump infusion        Date last updated:  10/02/2018 Insulin type: Novolog, Reservoir: 2mL Omnipod [id number] BASAL RATES and times: 0000: 0.2 units/hour 0600: 0.25 units/hour 1200: 0.2 units/hour 1800: 0.25 units/hour  CARB RATIO: 1 unit:18g carbs Corection Factor (Sensitivity) and times:  1 unit per 70 mg/dL BLOOD GLUCOSE  TARGET: 120-140 mg/dL Active Insulin Time:  4 hours  Sensor:  No   Refills:  0       * lidocaine 5 % Patch   Commonly known as:  LIDODERM   Notes to Patient:  Apply at 800pm at remove at 0800am        Dose:  1 patch   Place 1 patch onto the skin every 24 hours   Refills:  0       * lidocaine 5 % ointment   Commonly known as:  XYLOCAINE        Apply topically 3 times daily as needed for moderate pain   Refills:  0       LORAZEPAM PO        Dose:  0.5 mg   Take 0.5 mg by mouth 2 times daily as needed   Refills:  0       Lysine 500 MG Caps   Notes to Patient:  Home schedule        Dose:  1 capsule   Take 1 capsule by mouth 2 times daily   Refills:  0       Magnesium 300 MG Caps   Notes to Patient:  Home schedule        Dose:  1 capsule   Take 1 capsule by mouth 2 times daily   Refills:  0       midodrine 5 MG tablet   Commonly known as:  PROAMATINE        Dose:  5 mg   Take 5 mg by mouth 3 times daily   Refills:  0       MIRTAZAPINE PO        Dose:  45 mg   Take 45 mg by mouth At Bedtime   Refills:  0       MONTELUKAST SODIUM PO        Dose:  10 mg   Take 10 mg by mouth At Bedtime   Refills:  0       NOVOLOG SC        Inject Subcutaneous 3 times daily (with meals)   Refills:  0       ONDANSETRON PO        Dose:  4 mg   Take 4 mg by mouth every 6 hours as needed   Refills:  0       PROAIR  (90 Base) MCG/ACT inhaler   Generic drug:  albuterol        Dose:  2 puff   Inhale 2 puffs into the lungs every 4 hours as needed for shortness of breath / dyspnea or wheezing   Refills:  0       PROCTOZONE-HC 2.5 % cream   Generic drug:  hydrocortisone        Place rectally 2 times daily as needed for hemorrhoids   Refills:  0       promethazine 12.5 MG tablet   Commonly known as:  PHENERGAN        Dose:  25 mg   Take 25 mg by mouth every 6 hours as needed for nausea   Refills:  0       PROTONIX PO        Dose:  20 mg   Take 20 mg by mouth every morning (before breakfast)   Refills:  0       QVAR REDIHALER 80 MCG/ACT inhaler    Generic drug:  beclomethasone HFA   Notes to Patient:  Home schedule        Dose:  1 puff   Inhale 1 puff into the lungs daily   Refills:  0       ROPINIROLE HCL PO        Dose:  1.5 mg   Take 1.5 mg by mouth daily (with lunch)   Refills:  0       TIZANIDINE HCL PO        Dose:  2 mg   Take 2 mg by mouth daily as needed   Refills:  0       TOPIRAMATE PO        Dose:  200 mg   Take 200 mg by mouth 2 times daily   Refills:  0       TRIAZOLAM PO   Notes to Patient:  Home schedule        Dose:  0.5 mg   Take 0.5 mg by mouth   Refills:  0       Vitamin D-3 5000 units Tabs   Notes to Patient:  Home schedule        Dose:  1 tablet   Take 1 tablet by mouth daily   Refills:  0       * Notice:  This list has 4 medication(s) that are the same as other medications prescribed for you. Read the directions carefully, and ask your doctor or other care provider to review them with you.      STOP taking     aspirin 81 MG tablet           oxyCODONE-acetaminophen 5-325 MG per tablet   Commonly known as:  PERCOCET                Where to get your medicines      These medications were sent to [facility] [address] [address] [address]     Phone:  [phone]     rivaroxaban ANTICOAGULANT 10 MG Tabs tablet         Some of these will need a paper prescription and others can be bought over the counter. Ask your nurse if you have questions.     Bring a paper prescription for each of these medications     acetaminophen 325 MG tablet    oxyCODONE IR 10 MG tablet                Protect others around you: Learn how to safely use, store and throw away your medicines at www.disposemymeds.org.        ANTIBIOTIC INSTRUCTION     You've Been Prescribed an Antibiotic - Now What?  Your healthcare team thinks that you or your loved one might have an infection. Some infections can be treated with antibiotics, which are powerful, life-saving drugs. Like all medications, antibiotics  have side effects and should only be used when necessary. There are some important things you should know about your antibiotic treatment.      Your healthcare team may run tests before you start taking an antibiotic.    Your team may take samples (e.g., from your blood, urine or other areas) to run tests to look for bacteria. These test can be important to determine if you need an antibiotic at all and, if you do, which antibiotic will work best.      Within a few days, your healthcare team might change or even stop your antibiotic.    Your team may start you on an antibiotic while they are working to find out what is making you sick.    Your team might change your antibiotic because test results show that a different antibiotic would be better to treat your infection.    In some cases, once your team has more information, they learn that you do not need an antibiotic at all. They may find out that you don't have an infection, or that the antibiotic you're taking won't work against your infection. For example, an infection caused by a virus can't be treated with antibiotics. Staying on an antibiotic when you don't need it is more likely to be harmful than helpful.      You may experience side effects from your antibiotic.    Like all medications, antibiotics have side effects. Some of these can be serious.    Let you healthcare team know if you have any known allergies when you are admitted to the hospital.    One significant side effect of nearly all antibiotics is the risk of severe and sometimes deadly diarrhea caused by Clostridium difficile (C. Difficile). This occurs when a person takes antibiotics because some good germs are destroyed. Antibiotic use allows C. diificile to take over, putting patients at high risk for this serious infection.    As a patient or caregiver, it is important to understand your or your loved one's antibiotic treatment. It is especially important for caregivers to speak up when  patients can't speak for themselves. Here are some important questions to ask your healthcare team.    What infection is this antibiotic treating and how do you know I have that infection?    What side effects might occur from this antibiotic?    How long will I need to take this antibiotic?    Is it safe to take this antibiotic with other medications or supplements (e.g., vitamins) that I am taking?     Are there any special directions I need to know about taking this antibiotic? For example, should I take it with food?    How will I be monitored to know whether my infection is responding to the antibiotic?    What tests may help to make sure the right antibiotic is prescribed for me?      Information provided by:  www.cdc.gov/getsmart  [place].S[place] Department of Health and Human Services  Centers for disease Control and Prevention  National Center for Emerging and Zoonotic Infectious Diseases  Division of Healthcare Quality Promotion        Information about OPIOIDS     PRESCRIPTION OPIOIDS: WHAT YOU NEED TO KNOW   We gave you an opioid (narcotic) pain medicine. It is important to manage your pain, but opioids are not always the best choice. You should first try all the other options your care team gave you. Take this medicine for as short a time (and as few doses) as possible.    Some activities can increase your pain, such as bandage changes or therapy sessions. It may help to take your pain medicine 30 to 60 minutes before these activities. Reduce your stress by getting enough sleep, working on hobbies you enjoy and practicing relaxation or meditation. Talk to your care team about ways to manage your pain beyond prescription opioids.    These medicines have risks:    DO NOT drive when on new or higher doses of pain medicine. These medicines can affect your alertness and reaction times, and you could be arrested for driving under the influence (DUI). If you need to use opioids long-term, talk to your care team about  driving.    DO NOT operate heavy machinery    DO NOT do any other dangerous activities while taking these medicines.    DO NOT drink any alcohol while taking these medicines.     If the opioid prescribed includes acetaminophen, DO NOT take with any other medicines that contain acetaminophen. Read all labels carefully. Look for the word  acetaminophen  or  Tylenol.  Ask your pharmacist if you have questions or are unsure.    You can get addicted to pain medicines, especially if you have a history of addiction (chemical, alcohol or substance dependence). Talk to your care team about ways to reduce this risk.    All opioids tend to cause constipation. Drink plenty of water and eat foods that have a lot of fiber, such as fruits, vegetables, prune juice, apple juice and high-fiber cereal. Take a laxative (Miralax, milk of magnesia, Colace, Senna) if you don t move your bowels at least every other day. Other side effects include upset stomach, sleepiness, dizziness, throwing up, tolerance (needing more of the medicine to have the same effect), physical dependence and slowed breathing.    Store your pills in a secure place, locked if possible. We will not replace any lost or stolen medicine. If you don t finish your medicine, please throw away (dispose) as directed by your pharmacist. The [place] [employer] has more information about safe disposal: https://www.pca.[facility].[place].us/living-green/managing-unwanted-medications             Medication List: This is a list of all your medications and when to take them. Check marks below indicate your daily home schedule. Keep this list as a reference.      Medications           Morning Afternoon Evening Bedtime As Needed    acetaminophen 325 MG tablet   Commonly known as:  TYLENOL   Take 2 tablets (650 mg) by mouth every 4 hours as needed for other (multimodal surgical pain management along with NSAIDS and opioid medication as indicated based on pain control and  physical function.)   Last time this was given:  975 mg on 10/5/2018  1:04 PM                                   ALLOPURINOL PO   Take 200 mg by mouth At Bedtime   Last time this was given:  200 mg on 10/4/2018  9:33 PM   Next Dose Due:  Friday at bedtime                                   ascorbic acid 500 MG tablet   Commonly known as:  VITAMIN C   Take 500 mg by mouth daily   Last time this was given:  500 mg on 10/5/2018  8:23 AM   Next Dose Due:  saturday                                   ATORVASTATIN CALCIUM PO   Take 40 mg by mouth daily   Last time this was given:  40 mg on 10/4/2018  9:32 PM   Next Dose Due:  Friday at bedtime                                    budesonide 3 MG EC capsule   Commonly known as:  ENTOCORT EC   Take 9 mg by mouth every morning   Last time this was given:  9 mg on 10/5/2018  8:23 AM   Next Dose Due:  saturday                                   buPROPion 300 MG 24 hr tablet   Commonly known as:  WELLBUTRIN XL   Take 300 mg by mouth every morning   Last time this was given:  300 mg on 10/5/2018  8:23 AM   Next Dose Due:  saturday                                   calcium carb-cholecalciferol 600-500 MG-UNIT Caps   Take 1 capsule by mouth daily   Notes to Patient:  Home schedule                                * CENTRUM SILVER PO   Take 1 tablet by mouth daily   Last time this was given:  1 tablet on 10/5/2018  8:22 AM   Notes to Patient:  Home schedule                                * multivitamin Tabs tablet   Take 1 tablet by mouth daily   Last time this was given:  1 tablet on 10/5/2018  8:22 AM   Next Dose Due:  saturday                                   CLARITIN PO   Take 10 mg by mouth daily   Last time this was given:  10 mg on 10/5/2018  8:22 AM   Next Dose Due:  saturday                                   COZAAR PO   Take 25 mg by mouth daily   Notes to Patient:  Home schedule                                cyanocobalamin 1000 MCG/ML injection   Commonly known as:  VITAMIN B12    Inject 1 mL into the muscle every 30 days   Notes to Patient:  Home schedule                                EPINEPHrine 0.15 MG/0.3ML injection 2-pack   Commonly known as:  EPIPEN JR   Inject 0.15 mg into the muscle as needed for anaphylaxis                                   Ferrous Sulfate 324 (65 Fe) MG Tbec   Take 324 mg by mouth daily   Notes to Patient:  Home schedule                                fluorouracil 5 % cream   Commonly known as:  EFUDEX   Apply topically 2 times daily   Notes to Patient:  Home schedule                                glucagon 1 MG kit   Inject 1 mg into the muscle once                                HYDROMORPHONE HCL PO   Take 4 mg by mouth every 8 hours as needed                                   hydroxychloroquine 200 MG tablet   Commonly known as:  PLAQUENIL   Take 400 mg by mouth daily   Notes to Patient:  Home schedule                                insulin pump infusion   Date last updated:  10/02/2018 Insulin type: Novolog, Reservoir: 2mL Omnipod [id number] BASAL RATES and times: 0000: 0.2 units/hour 0600: 0.25 units/hour 1200: 0.2 units/hour 1800: 0.25 units/hour  CARB RATIO: 1 unit:18g carbs Corection Factor (Sensitivity) and times:  1 unit per 70 mg/dL BLOOD GLUCOSE TARGET: 120-140 mg/dL Active Insulin Time:  4 hours  Sensor:  No                                * lidocaine 5 % Patch   Commonly known as:  LIDODERM   Place 1 patch onto the skin every 24 hours   Notes to Patient:  Apply at 800pm at remove at 0800am                                * lidocaine 5 % ointment   Commonly known as:  XYLOCAINE   Apply topically 3 times daily as needed for moderate pain                                   LORAZEPAM PO   Take 0.5 mg by mouth 2 times daily as needed                                   Lysine 500 MG Caps   Take 1 capsule by mouth 2 times daily   Notes to Patient:  Home schedule                                Magnesium 300 MG Caps   Take 1 capsule by mouth 2 times daily   Notes  to Patient:  Home schedule                                midodrine 5 MG tablet   Commonly known as:  PROAMATINE   Take 5 mg by mouth 3 times daily   Last time this was given:  5 mg on 10/5/2018 11:58 AM   Next Dose Due:  This evening                                         MIRTAZAPINE PO   Take 45 mg by mouth At Bedtime   Last time this was given:  45 mg on 10/4/2018  9:32 PM   Next Dose Due:  Friday at bedtime                                   MONTELUKAST SODIUM PO   Take 10 mg by mouth At Bedtime   Last time this was given:  10 mg on 10/4/2018  9:33 PM   Next Dose Due:  Friday at bedtime                                   NOVOLOG SC   Inject Subcutaneous 3 times daily (with meals)                                ONDANSETRON PO   Take 4 mg by mouth every 6 hours as needed                                   oxyCODONE IR 10 MG tablet   Commonly known as:  ROXICODONE   Take 1-1.5 tablets (10-15 mg) by mouth every 3 hours as needed   Last time this was given:  15 mg on 10/5/2018 12:58 PM   Next Dose Due:  400pm today                                   PROAIR  (90 Base) MCG/ACT inhaler   Inhale 2 puffs into the lungs every 4 hours as needed for shortness of breath / dyspnea or wheezing   Generic drug:  albuterol                                   PROCTOZONE-HC 2.5 % cream   Place rectally 2 times daily as needed for hemorrhoids   Generic drug:  hydrocortisone                                   promethazine 12.5 MG tablet   Commonly known as:  PHENERGAN   Take 25 mg by mouth every 6 hours as needed for nausea                                   PROTONIX PO   Take 20 mg by mouth every morning (before breakfast)   Last time this was given:  20 mg on 10/5/2018  6:38 AM   Next Dose Due:  saturday                                   QVAR REDIHALER 80 MCG/ACT inhaler   Inhale 1 puff into the lungs daily   Generic drug:  beclomethasone HFA   Notes to Patient:  Home schedule                                rivaroxaban ANTICOAGULANT  10 MG Tabs tablet   Commonly known as:  XARELTO   Take 1 tablet (10 mg) by mouth daily for 14 days   Start taking on:  10/6/2018   Last time this was given:  10 mg on 10/5/2018  8:23 AM   Next Dose Due:  saturday                                   ROPINIROLE HCL PO   Take 1.5 mg by mouth daily (with lunch)   Last time this was given:  1.5 mg on 10/5/2018 11:58 AM   Next Dose Due:  saturday                                   TIZANIDINE HCL PO   Take 2 mg by mouth daily as needed                                   TOPIRAMATE PO   Take 200 mg by mouth 2 times daily   Last time this was given:  200 mg on 10/5/2018  8:23 AM   Next Dose Due:  This evening                                      TRIAZOLAM PO   Take 0.5 mg by mouth   Notes to Patient:  Home schedule                                Vitamin D-3 5000 units Tabs   Take 1 tablet by mouth daily   Notes to Patient:  Home schedule                                * Notice:  This list has 4 medication(s) that are the same as other medications prescribed for you. Read the directions carefully, and ask your doctor or other care provider to review them with you.

## 2018-10-02 NOTE — PROGRESS NOTES
10/02/18 1600   Quick Adds   Type of Visit Initial PT Evaluation   Living Environment   Lives With spouse   Living Arrangements apartment   Home Accessibility tub/shower is not walk in   Number of Stairs to Enter Home 0   Number of Stairs Within Home 0   Transportation Available car;family or friend will provide   Self-Care   Current Activity Tolerance good   Regular Exercise yes   Activity/Exercise Type walking   Equipment Currently Used at Home grab bar;walker, rolling;cane, straight;raised toilet   Functional Level Prior   Ambulation 0-->independent  (Intermittent cane/walker use)   Transferring 0-->independent   Toileting 0-->independent   Bathing 0-->independent   Fall history within last six months yes   Number of times patient has fallen within last six months 1   General Information   Onset of Illness/Injury or Date of Surgery - Date 10/02/18   Referring Physician Ruben PARISH   Patient/Family Goals Statement Return home   Pertinent History of Current Problem (include personal factors and/or comorbidities that impact the POC) 63 year old female s/p L TKA.    Precautions/Limitations fall precautions   Weight-Bearing Status - LLE weight-bearing as tolerated   General Info Comments KI with Cbloc   Cognitive Status Examination   Orientation orientation to person, place and time   Level of Consciousness alert   Follows Commands and Answers Questions 100% of the time   Pain Assessment   Patient Currently in Pain Yes, see Vital Sign flowsheet  (7/10 )   Integumentary/Edema   Integumentary/Edema Comments Dressing to the L LE clean, dry and intac   Range of Motion (ROM)   ROM Comment Decreased L LE AROM seconday to high levels of pain and weakness this session.    Strength   Strength Comments Needs mod A for SLR on the L LE   Transfer Skills   Transfer Comments Due to high pain levels, not appropriate for OOB mobility    Sensory Examination   Sensory Perception Comments Baseline neuropathy in feet.    Modality  "Interventions   Planned Modality Interventions Cryotherapy   Planned Modality Interventions Comments PRN   General Therapy Interventions   Planned Therapy Interventions bed mobility training;gait training;ROM;strengthening;transfer training;home program guidelines;progressive activity/exercise   Clinical Impression   Criteria for Skilled Therapeutic Intervention yes, treatment indicated   PT Diagnosis Impaired gait   Influenced by the following impairments Pain, decreased L LE AROM/strength, impaired activity tolerance   Functional limitations due to impairments Decreased independence with functional mobility   Clinical Presentation Stable/Uncomplicated   Clinical Presentation Rationale Stable.    Clinical Decision Making (Complexity) Low complexity   Therapy Frequency` 2 times/day   Predicted Duration of Therapy Intervention (days/wks) 3 days   Anticipated Discharge Disposition Other (see comments)  (Will be updated with mobility assessment)   Risk & Benefits of therapy have been explained Yes   Patient, Family & other staff in agreement with plan of care Yes   [facility] \"6 Clicks\"   2016, Trustees of [facility], under license to [employer].  All rights reserved.   6 Clicks Short Forms Basic Mobility Inpatient Short Form   [facility]  \"6 Clicks\" V.2 Basic Mobility Inpatient Short Form   1. Turning from your back to your side while in a flat bed without using bedrails? 3 - A Little   2. Moving from lying on your back to sitting on the side of a flat bed without using bedrails? 3 - A Little   3. Moving to and from a bed to a chair (including a wheelchair)? 2 - A Lot   4. Standing up from a chair using your arms (e.g., wheelchair, or bedside chair)? 2 - A Lot   5. To walk in hospital room? 2 - A Lot   6. Climbing 3-5 steps with a railing? 1 - Total   Basic Mobility Raw Score (Score out of 24.Lower scores equate to lower levels of function) 13   Total Evaluation Time   Total " Evaluation Time (Minutes) 5

## 2018-10-02 NOTE — OP NOTE
Procedure Date: 10/02/2018      DATE OF PROCEDURE:  10/02/2018.      PREOPERATIVE DIAGNOSIS:  Left knee primary osteoarthritis.      POSTOPERATIVE DIAGNOSIS:  Left knee primary osteoarthritis.      PROCEDURE:  Left total knee arthroplasty.      SURGEON:  Kai Miranda MD      ASSISTANT:  Miguel Estrella PA-C      ANESTHESIA:  General, regional and local.      ESTIMATED BLOOD LOSS:  Less than 20 mL.      DRAINS:  One Hemovac.      COMPLICATIONS:  None apparent.      INDICATIONS FOR PROCEDURE:  Tika is a 63-year-old female with longstanding left knee pain.  She attempted conservative management including injections, medications and activity modifications, which failed to relieve her symptoms.  Based on poor quality of life, she elected for total knee arthroplasty, understanding the risks, benefits and alternatives.  This was discussed prior to the procedure.  Consent was signed today.      DESCRIPTION OF PROCEDURE:  Tika was brought to the operating room, placed supine on the operating room table.  General endotracheal anesthesia was administered after block had been established in PAR.  Her left lower extremity was prepped and draped in the usual sterile fashion for total knee replacement.  After a timeout confirming the appropriate limb, a standard midline incision was undertaken with sharp dissection down to the patella.  Medial parapatellar arthrotomy was then performed.  Some of the fat pad and bursal tissue was excised, and a small medial release was then performed.  The knee cap was easily everted and flexed without difficulty.  We removed some meniscus tissue and ACL and used our opening reamer to open up the femoral canal and used our intramedullary device to take a 5-degree valgus cut, taking 10 off distally as she had a 10-degree flexion contracture preoperatively.  The cut was made and we were pleased.  We then sized her to a 2, used our 4-in-1 cutting block, made our anterior, posterior chamfer cuts,  and we were happy with the cut, and there was no notching noted.  Once that was completed, we then turned our attention to her tibia.  We made a nice flat cut, which took roughly 2 mm off the medial side and 9 mm off the lateral side, which again was consistent with our preoperative planning.  Once that was completed, we used our soft tissue tensioning blocks and felt it was slightly tight medially with a size 9, so a more aggressive medial release was performed, and then the soft tissues balanced nicely in both flexion and extension.  We then cut the middle blocks for our posterior stabilized component, releasing the PCL, removing meniscus and posterior osteophytes.  We then did a formal trial and we were pleased as she had excellent range of motion.  Now we then cut the patella to accept a 27-mm round patella.  This left 16 mm of good bone.  Once the patellar trial was performed, she had excellent patellar tracking and no lateral release was needed.  Once that was completed, we then reamed and punched the tibia, irrigated out copiously, injected the posterior capsule, cemented the components without difficulty, did a 3-minute IrriSept irrigation at the end of procedure.  Irrigated this out copiously.  We were happy with our 9 mm poly.  This was then placed in the knee without difficulty.  It was brought through full range of motion, excellent patellar tracking and full range of motion in the operating room.  Copious irrigation was then performed.  We then placed a drain, closed in layers.  Sterile dressings were applied.  She was brought to the PACU in stable condition.  Needle and sponge counts correct.  Of note, we did use antibiotic cement because of her diabetic status.      The patient will be weightbear as tolerates, PT, OT, pain control.  Ancef 2 grams was given within an hour of procedure.  This will be discontinued within 24 hours.  She will be given Xarelto and Pneumo boots for DVT prophylaxis.          JULIO CESAR KEE MD             D: 10/02/2018   T: 10/02/2018   MT: SURI      Name:     JUSTIN STYLES   MRN:      [id number]        Account:        [id number]   :      1955           Procedure Date: 10/02/2018      Document: [id number]       cc: [facility]

## 2018-10-02 NOTE — PHARMACY-ADMISSION MEDICATION HISTORY
Med rec was done by pre-admitting nurse then reviewed by pharmacy.      Prior to Admission medications    Medication Sig Last Dose Taking? Auth Provider   albuterol (PROAIR HFA) 108 (90 Base) MCG/ACT inhaler Inhale 2 puffs into the lungs every 4 hours as needed for shortness of breath / dyspnea or wheezing 10/1/2018 at 0800 Yes Unknown, Entered By History   ALLOPURINOL PO Take 200 mg by mouth At Bedtime  10/1/2018 at 2359 Yes Reported, Patient   ascorbic acid (VITAMIN C) 500 MG tablet Take 500 mg by mouth daily 10/1/2018 at 0600 Yes Unknown, Entered By History   ASPIRIN 81 MG OR TABS 1 TABLET DAILY 9/28/2018 at Unknown time Yes Reported, Patient   ATORVASTATIN CALCIUM PO Take 40 mg by mouth daily  10/1/2018 at 2359 Yes Reported, Patient   Beclomethasone Diprop HFA (QVAR REDIHALER) 80 MCG/ACT AERB Inhale 1 puff into the lungs daily  10/1/2018 at 0800 Yes Reported, Patient   budesonide (ENTOCORT EC) 3 MG EC capsule Take 9 mg by mouth every morning 10/1/2018 at 2359 Yes Unknown, Entered By History   buPROPion (WELLBUTRIN XL) 300 MG 24 hr tablet Take 300 mg by mouth every morning 10/1/2018 at 0600 Yes Unknown, Entered By History   calcium carb-cholecalciferol 600-500 MG-UNIT CAPS Take 1 capsule by mouth daily 10/1/2018 at 1200 Yes Unknown, Entered By History   Cholecalciferol (VITAMIN D-3) 5000 units TABS Take 1 tablet by mouth daily 10/1/2018 at 0600 Yes Unknown, Entered By History   Ferrous Sulfate 324 (65 Fe) MG TBEC Take 324 mg by mouth daily 10/1/2018 at 1200 Yes Unknown, Entered By History   fluorouracil (EFUDEX) 5 % cream Apply topically 2 times daily 10/1/2018 at 0900 Yes Unknown, Entered By History   HYDROMORPHONE HCL PO Take 4 mg by mouth every 8 hours as needed  9/29/2018 at 1600 Yes Reported, Patient   hydroxychloroquine (PLAQUENIL) 200 MG tablet Take 400 mg by mouth daily 10/1/2018 at 0600 Yes Reported, Patient   Insulin Aspart (NOVOLOG SC) Inject Subcutaneous 3 times daily (with meals)  10/2/2018 at  Unknown time Yes Reported, Patient   INSULIN PUMP - OUTPATIENT Date last updated:  10/02/2018  Insulin type: Novolog, Reservoir: 2mL  Omnipod [id number]  BASAL RATES and times:  0000: 0.2 units/hour  0600: 0.25 units/hour  1200: 0.2 units/hour  1800: 0.25 units/hour    CARB RATIO: 1 unit:18g carbs  Corection Factor (Sensitivity) and times:  1 unit per 70 mg/dL  BLOOD GLUCOSE TARGET: 120-140 mg/dL  Active Insulin Time:  4 hours    Sensor:  No 10/1/2018 at Placed left arm - Currently wearing Yes Unknown, Entered By History   lidocaine (LIDODERM) 5 % Patch Place 1 patch onto the skin every 24 hours Past Week at Unknown time Yes Unknown, Entered By History   lidocaine (XYLOCAINE) 5 % ointment Apply topically 3 times daily as needed for moderate pain Past Week at Unknown time Yes Unknown, Entered By History   Loratadine (CLARITIN PO) Take 10 mg by mouth daily  10/1/2018 at 0600 Yes Reported, Patient   LORAZEPAM PO Take 0.5 mg by mouth 2 times daily as needed  Past Week at Unknown time Yes Reported, Patient   Losartan Potassium (COZAAR PO) Take 25 mg by mouth daily 9/28/2018 Yes Reported, Patient   Lysine 500 MG CAPS Take 1 capsule by mouth 2 times daily 10/1/2018 at 1200 Yes Unknown, Entered By History   Magnesium 300 MG CAPS Take 1 capsule by mouth 2 times daily 10/1/2018 at 0600 Yes Unknown, Entered By History   midodrine (PROAMATINE) 5 MG tablet Take 5 mg by mouth 3 times daily 10/2/2018 at 0445 Yes Unknown, Entered By History   MIRTAZAPINE PO Take 45 mg by mouth At Bedtime  10/1/2018 at 2359 Yes Reported, Patient   MONTELUKAST SODIUM PO Take 10 mg by mouth At Bedtime  10/1/2018 at 2359 Yes Reported, Patient   Multiple Vitamins-Minerals (CENTRUM SILVER PO) Take 1 tablet by mouth daily  10/1/2018 at 0600 Yes Reported, Patient   multivitamin (OCUVITE) TABS tablet Take 1 tablet by mouth daily 10/1/2018 at 0600 Yes Reported, Patient   oxyCODONE-acetaminophen (PERCOCET) 5-325 MG per tablet Take 1-2 tablets by mouth every 6  hours as needed for severe pain  9/30/2018 at Unknown time Yes Reported, Patient   Pantoprazole Sodium (PROTONIX PO) Take 20 mg by mouth every morning (before breakfast) 10/1/2018 at 0600 Yes Reported, Patient   promethazine (PHENERGAN) 12.5 MG tablet Take 25 mg by mouth every 6 hours as needed for nausea 9/29/2018 at 1400 Yes Reported, Patient   ROPINIROLE HCL PO Take 1.5 mg by mouth daily (with lunch) 10/1/2018 at 1200 Yes Reported, Patient   TIZANIDINE HCL PO Take 2 mg by mouth daily as needed  10/1/2018 at 1500 Yes Reported, Patient   TOPIRAMATE PO Take 200 mg by mouth 2 times daily  10/1/2018 at 1600 Yes Reported, Patient   TRIAZOLAM PO Take 0.5 mg by mouth  10/1/2018 at 2359 Yes Reported, Patient   cyanocobalamin (VITAMIN B12) 1000 MCG/ML injection Inject 1 mL into the muscle every 30 days More than a month at Unknown time  Reported, Patient   EPINEPHrine (EPIPEN JR) 0.15 MG/0.3ML injection 2-pack Inject 0.15 mg into the muscle as needed for anaphylaxis More than a month at Unknown time  Reported, Patient   glucagon 1 MG kit Inject 1 mg into the muscle once More than a month at Unknown time  Unknown, Entered By History   hydrocortisone (PROCTOZONE-HC) 2.5 % cream Place rectally 2 times daily as needed for hemorrhoids More than a month at Unknown time  Unknown, Entered By History   ONDANSETRON PO Take 4 mg by mouth every 6 hours as needed  More than a month at Unknown time  Reported, Patient

## 2018-10-02 NOTE — CONSULTS
[facility]  Hospitalist Consult note    Name: Tika Martinez      MRN: [id number]  YOB: 1955    Age: 63 year old  Date of admission: 10/2/2018  Primary care provider: Christ Wiggins            Assessment and Plan:   Tika Martinez is a 63 year old female with a history of diabetes mellitus, asthma, chronic pain syndrome, migraines, hypotension, hyperlipidemia, GERD, arthritis, and anxiety who presents with left total knee arthroplasty.    1.  Left total knee arthroplasty.  Pain medications as needed.  Use incentive spirometry.  Work with therapy.    2  Diabetes mellitus.  Restart insulin pump.  NovoLog sliding scale as needed.    3.  Hypotension.  Restart Midodrine.  Hold losartan for now.    4.  Asthma.  No acute exacerbation.  Restart montelukast.  Albuterol as needed.    5.  Hyperlipidemia.  Restart atorvastatin.    6.  Anxiety.  Restart bupropion.    7.  GERD.  Restart pantoprazole.    Code status: Full code  Prophylaxis: Xarelto.              Chief Complaint:   Left total knee arthroplasty.         History of Present Illness:   Tika Martinez is a 63 year old female who presents with left total knee arthroplasty.  She is having some left knee pain.  Denies any other complaints at this time.            Past Medical History:     Past Medical History:   Diagnosis Date     Anxiety      Arthritis     hips, knees, feet, lower back     Diabetes mellitus type 2      Gastroesophageal reflux disease      Hyperlipidemia      Hypotension      Migraine      Migraines      Noninfectious ileitis     lymphocytic colitis     Other chronic pain     generalized     Renal disease     stage 3 chronic kidney disease     Uncomplicated asthma              Past Surgical History:     Past Surgical History:   Procedure Laterality Date     ABDOMEN SURGERY      gastric bypass     APPENDECTOMY       CHOLECYSTECTOMY       ENT SURGERY      tonsillectomy with adnoids     EYE SURGERY      lt retina  reattachement, annemarie cataract     GYN SURGERY      hysterectomy, mass removed from [facility]     ORTHOPEDIC SURGERY               Social History:     Social History   Substance Use Topics     Smoking status: Never Smoker     Smokeless tobacco: Never Used     Alcohol use No             Family History:   Twin brother with diabetes mellitus.         Allergies:     Allergies   Allergen Reactions     Bee Venom Anaphylaxis     Midrin [Isometheptene-Apap-Dichloral] Anaphylaxis     Chest pain     Mushrooms [Mushroom] Anaphylaxis     Stadol [Butorphanol] Shortness Of Breath     Ambien [Zolpidem]      confusion     Betadine [Povidone Iodine]      Compazine      Compazine [Prochlorperazine]      Contrast Dye      IV dye, shellfish, iodine     Corticosteroids      phycosis     Droperidol      Tremors       Glucosamine      angioedema     Haldol [Haloperidol]      dystonia     Iodine      respiratory     Lunesta [Eszopiclone]      Confusion  Found wandering in street     Metoclopramide Hcl      Jerking movements  Reglan     Nuts      Shellfish Allergy      Sulfa Drugs      Sumatriptan Succinate      Neurologist won't let her use uit, thought she was having a heart attack,  Imitrex             Medications:     Prior to Admission medications    Medication Sig Last Dose Taking? Auth Provider   albuterol (PROAIR HFA) 108 (90 Base) MCG/ACT inhaler Inhale 2 puffs into the lungs every 4 hours as needed for shortness of breath / dyspnea or wheezing 10/1/2018 at 0800 Yes Unknown, Entered By History   ALLOPURINOL PO Take 200 mg by mouth At Bedtime  10/1/2018 at 2359 Yes Reported, Patient   ascorbic acid (VITAMIN C) 500 MG tablet Take 500 mg by mouth daily 10/1/2018 at 0600 Yes Unknown, Entered By History   ASPIRIN 81 MG OR TABS 1 TABLET DAILY 9/28/2018 at Unknown time Yes Reported, Patient   ATORVASTATIN CALCIUM PO Take 40 mg by mouth daily  10/1/2018 at 2359 Yes Reported, Patient   Beclomethasone Diprop HFA (QVAR REDIHALER) 80 MCG/ACT AERB  Inhale 1 puff into the lungs daily  10/1/2018 at 0800 Yes Reported, Patient   budesonide (ENTOCORT EC) 3 MG EC capsule Take 9 mg by mouth every morning 10/1/2018 at 2359 Yes Unknown, Entered By History   buPROPion (WELLBUTRIN XL) 300 MG 24 hr tablet Take 300 mg by mouth every morning 10/1/2018 at 0600 Yes Unknown, Entered By History   calcium carb-cholecalciferol 600-500 MG-UNIT CAPS Take 1 capsule by mouth daily 10/1/2018 at 1200 Yes Unknown, Entered By History   Cholecalciferol (VITAMIN D-3) 5000 units TABS Take 1 tablet by mouth daily 10/1/2018 at 0600 Yes Unknown, Entered By History   Ferrous Sulfate 324 (65 Fe) MG TBEC Take 324 mg by mouth daily 10/1/2018 at 1200 Yes Unknown, Entered By History   fluorouracil (EFUDEX) 5 % cream Apply topically 2 times daily 10/1/2018 at 0900 Yes Unknown, Entered By History   HYDROMORPHONE HCL PO Take 4 mg by mouth every 8 hours as needed  9/29/2018 at 1600 Yes Reported, Patient   hydroxychloroquine (PLAQUENIL) 200 MG tablet Take 400 mg by mouth daily 10/1/2018 at 0600 Yes Reported, Patient   Insulin Aspart (NOVOLOG SC) Inject Subcutaneous 3 times daily (with meals)  10/2/2018 at Unknown time Yes Reported, Patient   INSULIN PUMP - OUTPATIENT Date last updated:  10/02/2018  Insulin type: Novolog, Reservoir: 2mL  Omnipod [id number]  BASAL RATES and times:  0000: 0.2 units/hour  0600: 0.25 units/hour  1200: 0.2 units/hour  1800: 0.25 units/hour    CARB RATIO: 1 unit:18g carbs  Corection Factor (Sensitivity) and times:  1 unit per 70 mg/dL  BLOOD GLUCOSE TARGET: 120-140 mg/dL  Active Insulin Time:  4 hours    Sensor:  No 10/1/2018 at Placed left arm - Currently wearing Yes Unknown, Entered By History   lidocaine (LIDODERM) 5 % Patch Place 1 patch onto the skin every 24 hours Past Week at Unknown time Yes Unknown, Entered By History   lidocaine (XYLOCAINE) 5 % ointment Apply topically 3 times daily as needed for moderate pain Past Week at Unknown time Yes Unknown, Entered By History    Loratadine (CLARITIN PO) Take 10 mg by mouth daily  10/1/2018 at 0600 Yes Reported, Patient   LORAZEPAM PO Take 0.5 mg by mouth 2 times daily as needed  Past Week at Unknown time Yes Reported, Patient   Losartan Potassium (COZAAR PO) Take 25 mg by mouth daily 9/28/2018 Yes Reported, Patient   Lysine 500 MG CAPS Take 1 capsule by mouth 2 times daily 10/1/2018 at 1200 Yes Unknown, Entered By History   Magnesium 300 MG CAPS Take 1 capsule by mouth 2 times daily 10/1/2018 at 0600 Yes Unknown, Entered By History   midodrine (PROAMATINE) 5 MG tablet Take 5 mg by mouth 3 times daily 10/2/2018 at 0445 Yes Unknown, Entered By History   MIRTAZAPINE PO Take 45 mg by mouth At Bedtime  10/1/2018 at 2359 Yes Reported, Patient   MONTELUKAST SODIUM PO Take 10 mg by mouth At Bedtime  10/1/2018 at 2359 Yes Reported, Patient   Multiple Vitamins-Minerals (CENTRUM SILVER PO) Take 1 tablet by mouth daily  10/1/2018 at 0600 Yes Reported, Patient   multivitamin (OCUVITE) TABS tablet Take 1 tablet by mouth daily 10/1/2018 at 0600 Yes Reported, Patient   oxyCODONE-acetaminophen (PERCOCET) 5-325 MG per tablet Take 1-2 tablets by mouth every 6 hours as needed for severe pain  9/30/2018 at Unknown time Yes Reported, Patient   Pantoprazole Sodium (PROTONIX PO) Take 20 mg by mouth every morning (before breakfast) 10/1/2018 at 0600 Yes Reported, Patient   promethazine (PHENERGAN) 12.5 MG tablet Take 25 mg by mouth every 6 hours as needed for nausea 9/29/2018 at 1400 Yes Reported, Patient   ROPINIROLE HCL PO Take 1.5 mg by mouth daily (with lunch) 10/1/2018 at 1200 Yes Reported, Patient   TIZANIDINE HCL PO Take 2 mg by mouth daily as needed  10/1/2018 at 1500 Yes Reported, Patient   TOPIRAMATE PO Take 200 mg by mouth 2 times daily  10/1/2018 at 1600 Yes Reported, Patient   TRIAZOLAM PO Take 0.5 mg by mouth  10/1/2018 at 2359 Yes Reported, Patient   cyanocobalamin (VITAMIN B12) 1000 MCG/ML injection Inject 1 mL into the muscle every 30 days More  "than a month at Unknown time  Reported, Patient   EPINEPHrine (EPIPEN JR) 0.15 MG/0.3ML injection 2-pack Inject 0.15 mg into the muscle as needed for anaphylaxis More than a month at Unknown time  Reported, Patient   glucagon 1 MG kit Inject 1 mg into the muscle once More than a month at Unknown time  Unknown, Entered By History   hydrocortisone (PROCTOZONE-HC) 2.5 % cream Place rectally 2 times daily as needed for hemorrhoids More than a month at Unknown time  Unknown, Entered By History   ONDANSETRON PO Take 4 mg by mouth every 6 hours as needed  More than a month at Unknown time  Reported, Patient             Review of Systems:   A Comprehensive greater than 10 system review of systems was carried out.  Pertinent positives and negatives are noted above.  Otherwise negative for contributory information.           Physical Exam:   Blood pressure 124/58, temperature 98.4  F (36.9  C), resp. rate 14, height 1.676 m (5' 6\"), weight 70.8 kg (156 lb), SpO2 100 %, not currently breastfeeding.  Wt Readings from Last 1 Encounters:   10/02/18 70.8 kg (156 lb)     Exam:  GENERAL: No apparent distress. Awake, alert, and fully oriented.  HEENT: Normocephalic, atraumatic. Extraocular movements intact.  CARDIOVASCULAR: Regular rate and rhythm without murmurs or rubs. No S3.  PULMONARY: Clear to auscultation bilaterally.  ABDOMINAL: Soft, non-tender, non-distended. Bowel sounds normoactive.   EXTREMITIES: No cyanosis or clubbing. No appreciable edema.  NEUROLOGICAL: CN 2-12 grossly intact, no focal neurological deficits.  DERMATOLOGICAL: No rash, ulcer, bruising, nor jaundice.          Data:       Laboratory:  No results for input(s): WBC, HGB, HCT, MCV, PLT in the last 168 hours.  No results for input(s): NA, POTASSIUM, CHLORIDE, CO2, ANIONGAP, GLC, BUN, CR, GFRESTIMATED, GFRESTBLACK, KEMAL in the last 168 hours.  No results for input(s): CULT in the last 168 hours.    Imaging:  Recent Results (from the past 24 hour(s))   XR Knee " Port Left 1/2 Views    Narrative    PORTABLE TWO VIEWS OF THE LEFT KNEE  10/2/2018 10:14 AM     HISTORY: Postoperative evaluation of the left knee.    COMPARISON: 8/15/2006    FINDINGS: There are surgical changes of a left total knee  arthroplasty. The hardware is intact with no fracture or other  complication seen. Anterior skin staples are seen. A soft tissue drain  is present. No other abnormality is demonstrated.      Impression    IMPRESSION: Unremarkable postoperative appearance of the left knee.    YRN BLACKMAN MD

## 2018-10-02 NOTE — BRIEF OP NOTE
[facility] Brief Operative Note    Pre-operative diagnosis: DJD   Post-operative diagnosis same   Procedure: Procedure(s):  left total knee arthroplasty    choice anesthesia - Wound Class: I-Clean   Surgeon(s): Surgeon(s) and Role:     * Kai Miranda MD - Primary     * Jeffery Estrella PA   Estimated blood loss: * No values recorded between 10/2/2018  7:49 AM and 10/2/2018  9:10 AM *    Specimens: * No specimens in log *   Findings: djd

## 2018-10-02 NOTE — ANESTHESIA POSTPROCEDURE EVALUATION
Patient: Tika Martinez    Procedure(s):  left total knee arthroplasty    choice anesthesia - Wound Class: I-Clean    Diagnosis:DJD  Diagnosis Additional Information: Left knee primary osteoarthritis.     Anesthesia Type:  General    Note:  Anesthesia Post Evaluation    Patient location during evaluation: PACU  Patient participation: Able to fully participate in evaluation  Level of consciousness: awake  Pain management: adequate  Airway patency: patent  Cardiovascular status: acceptable  Respiratory status: acceptable  Hydration status: acceptable  PONV: controlled     Anesthetic complications: None          Last vitals:  Vitals:    10/02/18 1005 10/02/18 1010 10/02/18 1023   BP: 140/80 138/73    Resp: 10 (!) 6 12   Temp:      SpO2: 100% 100% 100%         Electronically Signed By: Germain De La O DO  October 2, 2018  10:44 AM

## 2018-10-03 ENCOUNTER — APPOINTMENT (OUTPATIENT)
Dept: PHYSICAL THERAPY | Facility: CLINIC | Age: 63
End: 2018-10-03
Attending: ORTHOPAEDIC SURGERY
Payer: COMMERCIAL

## 2018-10-03 ENCOUNTER — APPOINTMENT (OUTPATIENT)
Dept: OCCUPATIONAL THERAPY | Facility: CLINIC | Age: 63
End: 2018-10-03
Attending: ORTHOPAEDIC SURGERY
Payer: COMMERCIAL

## 2018-10-03 LAB
ANION GAP SERPL CALCULATED.3IONS-SCNC: 6 MMOL/L (ref 3–14)
BUN SERPL-MCNC: 20 MG/DL (ref 7–30)
CALCIUM SERPL-MCNC: 7.9 MG/DL (ref 8.5–10.1)
CHLORIDE SERPL-SCNC: 108 MMOL/L (ref 94–109)
CO2 SERPL-SCNC: 24 MMOL/L (ref 20–32)
CREAT SERPL-MCNC: 1.32 MG/DL (ref 0.52–1.04)
GFR SERPL CREATININE-BSD FRML MDRD: 41 ML/MIN/1.7M2
GLUCOSE BLDC GLUCOMTR-MCNC: 132 MG/DL (ref 70–99)
GLUCOSE BLDC GLUCOMTR-MCNC: 138 MG/DL (ref 70–99)
GLUCOSE BLDC GLUCOMTR-MCNC: 140 MG/DL (ref 70–99)
GLUCOSE BLDC GLUCOMTR-MCNC: 160 MG/DL (ref 70–99)
GLUCOSE SERPL-MCNC: 165 MG/DL (ref 70–99)
HGB BLD-MCNC: 9.3 G/DL (ref 11.7–15.7)
POTASSIUM SERPL-SCNC: 3.9 MMOL/L (ref 3.4–5.3)
SODIUM SERPL-SCNC: 138 MMOL/L (ref 133–144)

## 2018-10-03 PROCEDURE — 97535 SELF CARE MNGMENT TRAINING: CPT | Mod: GO | Performed by: STUDENT IN AN ORGANIZED HEALTH CARE EDUCATION/TRAINING PROGRAM

## 2018-10-03 PROCEDURE — 80048 BASIC METABOLIC PNL TOTAL CA: CPT | Performed by: INTERNAL MEDICINE

## 2018-10-03 PROCEDURE — 12000000 ZZH R&B MED SURG/OB

## 2018-10-03 PROCEDURE — 25000132 ZZH RX MED GY IP 250 OP 250 PS 637: Performed by: INTERNAL MEDICINE

## 2018-10-03 PROCEDURE — 25000132 ZZH RX MED GY IP 250 OP 250 PS 637: Performed by: ORTHOPAEDIC SURGERY

## 2018-10-03 PROCEDURE — 40000275 ZZH STATISTIC RCP TIME EA 10 MIN

## 2018-10-03 PROCEDURE — 99232 SBSQ HOSP IP/OBS MODERATE 35: CPT | Performed by: INTERNAL MEDICINE

## 2018-10-03 PROCEDURE — 97530 THERAPEUTIC ACTIVITIES: CPT | Mod: GP

## 2018-10-03 PROCEDURE — 36415 COLL VENOUS BLD VENIPUNCTURE: CPT | Performed by: INTERNAL MEDICINE

## 2018-10-03 PROCEDURE — 94640 AIRWAY INHALATION TREATMENT: CPT

## 2018-10-03 PROCEDURE — 00000146 ZZHCL STATISTIC GLUCOSE BY METER IP

## 2018-10-03 PROCEDURE — 97110 THERAPEUTIC EXERCISES: CPT | Mod: GP

## 2018-10-03 PROCEDURE — 40000193 ZZH STATISTIC PT WARD VISIT

## 2018-10-03 PROCEDURE — 25000128 H RX IP 250 OP 636: Performed by: ORTHOPAEDIC SURGERY

## 2018-10-03 PROCEDURE — 97165 OT EVAL LOW COMPLEX 30 MIN: CPT | Mod: GO | Performed by: STUDENT IN AN ORGANIZED HEALTH CARE EDUCATION/TRAINING PROGRAM

## 2018-10-03 PROCEDURE — 97116 GAIT TRAINING THERAPY: CPT | Mod: GP

## 2018-10-03 PROCEDURE — 85018 HEMOGLOBIN: CPT | Performed by: INTERNAL MEDICINE

## 2018-10-03 PROCEDURE — 40000133 ZZH STATISTIC OT WARD VISIT: Performed by: STUDENT IN AN ORGANIZED HEALTH CARE EDUCATION/TRAINING PROGRAM

## 2018-10-03 RX ORDER — HYDROXYZINE HYDROCHLORIDE 25 MG/1
25-50 TABLET, FILM COATED ORAL EVERY 6 HOURS PRN
Status: DISCONTINUED | OUTPATIENT
Start: 2018-10-03 | End: 2018-10-05 | Stop reason: HOSPADM

## 2018-10-03 RX ADMIN — ACETAMINOPHEN 975 MG: 325 TABLET, FILM COATED ORAL at 13:57

## 2018-10-03 RX ADMIN — MONTELUKAST SODIUM 10 MG: 10 TABLET, FILM COATED ORAL at 21:12

## 2018-10-03 RX ADMIN — ONDANSETRON 4 MG: 2 INJECTION INTRAMUSCULAR; INTRAVENOUS at 11:55

## 2018-10-03 RX ADMIN — RIVAROXABAN 10 MG: 10 TABLET, FILM COATED ORAL at 08:40

## 2018-10-03 RX ADMIN — FLUTICASONE FUROATE 1 PUFF: 100 POWDER RESPIRATORY (INHALATION) at 07:38

## 2018-10-03 RX ADMIN — OXYCODONE HYDROCHLORIDE 10 MG: 5 TABLET ORAL at 21:13

## 2018-10-03 RX ADMIN — ATORVASTATIN CALCIUM 40 MG: 40 TABLET, FILM COATED ORAL at 21:12

## 2018-10-03 RX ADMIN — MIDODRINE HYDROCHLORIDE 5 MG: 5 TABLET ORAL at 07:08

## 2018-10-03 RX ADMIN — Medication 0.5 MG: at 08:36

## 2018-10-03 RX ADMIN — MIDODRINE HYDROCHLORIDE 5 MG: 5 TABLET ORAL at 12:03

## 2018-10-03 RX ADMIN — PANTOPRAZOLE SODIUM 20 MG: 20 TABLET, DELAYED RELEASE ORAL at 07:08

## 2018-10-03 RX ADMIN — SENNOSIDES AND DOCUSATE SODIUM 2 TABLET: 8.6; 5 TABLET ORAL at 21:12

## 2018-10-03 RX ADMIN — OXYCODONE HYDROCHLORIDE 10 MG: 5 TABLET ORAL at 10:02

## 2018-10-03 RX ADMIN — OXYCODONE HYDROCHLORIDE 10 MG: 5 TABLET ORAL at 17:27

## 2018-10-03 RX ADMIN — OXYCODONE HYDROCHLORIDE 10 MG: 5 TABLET ORAL at 13:57

## 2018-10-03 RX ADMIN — MULTIPLE VITAMINS W/ MINERALS TAB 1 TABLET: TAB at 08:39

## 2018-10-03 RX ADMIN — MIDODRINE HYDROCHLORIDE 5 MG: 5 TABLET ORAL at 17:27

## 2018-10-03 RX ADMIN — ACETAMINOPHEN 975 MG: 325 TABLET, FILM COATED ORAL at 21:12

## 2018-10-03 RX ADMIN — ALLOPURINOL 200 MG: 100 TABLET ORAL at 21:12

## 2018-10-03 RX ADMIN — HYDROXYZINE HYDROCHLORIDE 25 MG: 25 TABLET ORAL at 17:27

## 2018-10-03 RX ADMIN — OXYCODONE HYDROCHLORIDE 10 MG: 5 TABLET ORAL at 07:08

## 2018-10-03 RX ADMIN — Medication 0.5 MG: at 11:55

## 2018-10-03 RX ADMIN — ONDANSETRON 4 MG: 2 INJECTION INTRAMUSCULAR; INTRAVENOUS at 08:44

## 2018-10-03 RX ADMIN — OXYCODONE HYDROCHLORIDE 10 MG: 5 TABLET ORAL at 00:56

## 2018-10-03 RX ADMIN — OXYCODONE HYDROCHLORIDE 10 MG: 5 TABLET ORAL at 03:58

## 2018-10-03 RX ADMIN — ROPINIROLE HYDROCHLORIDE 1.5 MG: 1 TABLET, FILM COATED ORAL at 12:03

## 2018-10-03 RX ADMIN — BUDESONIDE 9 MG: 3 CAPSULE ORAL at 08:41

## 2018-10-03 RX ADMIN — Medication 0.5 MG: at 15:36

## 2018-10-03 RX ADMIN — TOPIRAMATE 200 MG: 100 TABLET, FILM COATED ORAL at 08:40

## 2018-10-03 RX ADMIN — TOPIRAMATE 200 MG: 100 TABLET, FILM COATED ORAL at 21:12

## 2018-10-03 RX ADMIN — CHOLECALCIFEROL CAP 125 MCG (5000 UNIT) 5000 UNITS: 125 CAP at 08:41

## 2018-10-03 RX ADMIN — CEFAZOLIN SODIUM 2 G: 2 INJECTION, SOLUTION INTRAVENOUS at 00:52

## 2018-10-03 RX ADMIN — SENNOSIDES AND DOCUSATE SODIUM 1 TABLET: 8.6; 5 TABLET ORAL at 08:39

## 2018-10-03 RX ADMIN — OXYCODONE HYDROCHLORIDE AND ACETAMINOPHEN 500 MG: 500 TABLET ORAL at 08:38

## 2018-10-03 RX ADMIN — ACETAMINOPHEN 975 MG: 325 TABLET, FILM COATED ORAL at 07:07

## 2018-10-03 RX ADMIN — Medication 2 LOZENGE: at 07:27

## 2018-10-03 RX ADMIN — MIRTAZAPINE 45 MG: 15 TABLET, FILM COATED ORAL at 21:12

## 2018-10-03 RX ADMIN — LORATADINE 10 MG: 10 TABLET ORAL at 08:39

## 2018-10-03 RX ADMIN — BUPROPION HYDROCHLORIDE 300 MG: 150 TABLET, FILM COATED, EXTENDED RELEASE ORAL at 08:40

## 2018-10-03 ASSESSMENT — ACTIVITIES OF DAILY LIVING (ADL)
ADLS_ACUITY_SCORE: 15
ADLS_ACUITY_SCORE: 11
ADLS_ACUITY_SCORE: 13
ADLS_ACUITY_SCORE: 11
ADLS_ACUITY_SCORE: 13
PREVIOUS_RESPONSIBILITIES: MEAL PREP;HOUSEKEEPING;LAUNDRY;DRIVING
ADLS_ACUITY_SCORE: 13

## 2018-10-03 NOTE — PLAN OF CARE
Problem: Patient Care Overview  Goal: Plan of Care/Patient Progress Review  OT: Evaluation completed, treatment initiated POD#1 L TKA; pt currently requires support of KI      Discharge Planner OT   Patient plan for discharge: return home with  assist  Current status: pt able to follow instructions to don undergarment while in long sit position in bed, SBA; pt required Mod A to don KI, able to participate and follow cues; pt able to complete transfer supine>sitting, SBA; sit<>stand transfers with FWW, CGA and cues for safe hand placement; pt completed mobility in room, FWW, CGA in order to complete bathroom tasks including toilet transfer with elevated commode over comfort height toilet, FWW, CGA; pt declined willing to try comfort height toilet which is simulated to home set-up; pt reported fatigue and pain; pt able to complete 2 standing g/h tasks at sink, FWW, SBA  Barriers to return to prior living situation: pain, fatigue  Recommendations for discharge: anticipate pt able to return home with  assist for transportation and household IADLs  Rationale for recommendations: continued OT services as pt below baseline independence with dressing, bathing, and toilet tasks       Entered by: Mary Hollins 10/03/2018 3:41 PM

## 2018-10-03 NOTE — PLAN OF CARE
Problem: Knee Arthroplasty (Total, Partial) (Adult)  Goal: Signs and Symptoms of Listed Potential Problems Will be Absent, Minimized or Managed (Knee Arthroplasty)  Signs and symptoms of listed potential problems will be absent, minimized or managed by discharge/transition of care (reference Knee Arthroplasty (Total, Partial) (Adult) CPG).   Outcome: No Change  Pt running low grade temps today, did get up to 101.4 last night, vss though tachy when temp elevated. CMS intact except for her neuropathy her left foot swollen due to ace wrap starting at her ankle. Ace wrap clean and dry drain discontinued. Pain ratings high today, oxycodone requiring IV DIaudid and Zofran for breakthrough pain. Voiding well, passing flatus but eating only small amounts and no lunch at all. Following her blood sugars and controlling her own insulin pump.Up with assist of 1 and walker, does need immobilizer on when up. Awaiting surgery group rounding.

## 2018-10-03 NOTE — PLAN OF CARE
Problem: Patient Care Overview  Goal: Plan of Care/Patient Progress Review  Outcome: Improving  A&Ox4. VSS. 2L of O2 via nasal canula. Capnography monitoring. Baseline numbness/tingling in BLE, CMS intact otherwise. Dressing CDI. Up w/ Ax2, gait belt, walker and KI to BSC. Glucose checks. Pt. has insulin pump, self-managed. BS hypoactive, tolerating MOD/CHO diet, passing flatus. Voiding in adequate amts. Pain managed w/ scheduled po Tylenol and prn po Oxycodone and IV Dilaudid. Hemovac patent. IVF infusing. Will continue to monitor.

## 2018-10-03 NOTE — PROGRESS NOTES
10/03/18 1132   Quick Adds   Type of Visit Initial Occupational Therapy Evaluation   Living Environment   Lives With spouse   Living Arrangements apartment   Home Accessibility tub/shower is not walk in   Number of Stairs to Enter Home 0   Number of Stairs Within Home 0   Transportation Available car;family or friend will provide   Living Environment Comment pt lives with supportive spouse in apartment, reports he works but will have time off to assist her as needed at discharge   Self-Care   Usual Activity Tolerance good   Current Activity Tolerance fair   Regular Exercise yes   Equipment Currently Used at Home grab bar   Functional Level Prior   Ambulation 0-->independent   Transferring 0-->independent   Toileting 1-->assistive equipment  (comfort height toilet, vanity)   Bathing 1-->assistive equipment  (tub shower, grab bar)   Dressing 0-->independent   Fall history within last six months yes   Number of times patient has fallen within last six months 1   Prior Functional Level Comment pt independent at baseline, uses grab bar in shower and has elevated toilet   General Information   Onset of Illness/Injury or Date of Surgery - Date 10/02/18   Referring Physician Ruben PARISH   Patient/Family Goals Statement return home   Additional Occupational Profile Info/Pertinent History of Current Problem POD#1 L TKA; at time of eval pt required use of KI; PMHx diabetes mellitus, asthma, chronic pain syndrome, migraines, hypotension, hyperlipidemia, GERD, arthritis, and anxiety    Precautions/Limitations fall precautions   Weight-Bearing Status - LLE weight-bearing as tolerated  (KI)   Cognitive Status Examination   Orientation orientation to person, place and time   Level of Consciousness alert   Able to Follow Commands WNL/WFL   Cognitive Comment appropriate cognition during session   Visual Perception   Visual Perception Wears glasses   Pain Assessment   Patient Currently in Pain (6-7 at rest)   Mobility   Bed Mobility  "Comments Min A for LE   Transfer Skills   Transfer Comments FWW, CGA, KI   Transfer Skill: Toilet Transfer   Level of [place]: Toilet contact guard  (elevated commode over toilet)   Physical Assist/Nonphysical Assist: Toilet verbal cues   Weight-Bearing Restrictions: Toilet weight-bearing as tolerated   Assistive Device rolling walker   Balance   Balance Comments impaired post-op   Lower Body Dressing   Level of [place]: Dress Lower Body stand-by assist   Physical Assist/Nonphysical Assist: Dress Lower Body verbal cues   Toileting   Level of [place]: Toilet contact guard   Physical Assist/Nonphysical Assist: Toilet verbal cues   Instrumental Activities of Daily Living (IADL)   Previous Responsibilities meal prep;housekeeping;laundry;driving   Activities of Daily Living Analysis   Impairments Contributing to Impaired Activities of Daily Living balance impaired;pain;strength decreased   General Therapy Interventions   Planned Therapy Interventions ADL retraining;IADL retraining   Clinical Impression   Criteria for Skilled Therapeutic Interventions Met yes, treatment indicated   OT Diagnosis impaired ability to manage ADL/IADLs safely and independently   Influenced by the following impairments post-op pain, fatigue, impaired activity tolerance   Assessment of Occupational Performance 1-3 Performance Deficits   Identified Performance Deficits impaired ability to manage dressing, bathing and toilet tasks   Clinical Decision Making (Complexity) Low complexity   Therapy Frequency daily   Predicted Duration of Therapy Intervention (days/wks) 2-3 days   Anticipated Discharge Disposition Home with Assist   Risks and Benefits of Treatment have been explained. Yes   Patient, Family & other staff in agreement with plan of care Yes   Clinical Impression Comments pt able to demonstrate ability to benefit from skilled OT services   [facility] AM-PAC TM \"6 Clicks\"   2016, Trustees of [facility], under " "license to [employer].  All rights reserved.   6 Clicks Short Forms Daily Activity Inpatient Short Form   [facility] AM-PAC  \"6 Clicks\" Daily Activity Inpatient Short Form   1. Putting on and taking off regular lower body clothing? 3 - A Little   2. Bathing (including washing, rinsing, drying)? 3 - A Little   3. Toileting, which includes using toilet, bedpan or urinal? 3 - A Little   4. Putting on and taking off regular upper body clothing? 4 - None   5. Taking care of personal grooming such as brushing teeth? 3 - A Little   6. Eating meals? 4 - None   Daily Activity Raw Score (Score out of 24.Lower scores equate to lower levels of function) 20   Total Evaluation Time   Total Evaluation Time (Minutes) 8     "

## 2018-10-03 NOTE — PLAN OF CARE
Problem: Patient Care Overview  Goal: Plan of Care/Patient Progress Review  Outcome: Improving  Pt up with 1 assist, gait belt, knee immobilizer, and walker to bedside commode. Suggested that pt would be a 2 assist when ambulating in hallway with knee immobilizer, gait belt, and walker. Pain partially controlled with oxycodone and tylenol. LS clear - encouraged IS use and CABD exercises. Explained to the patient on the importance of using the Incentive Spirometer is especially when experiencing temperatures (99.9. 101.4, 101). BS active, no flatus. CMS intact except for baseline neuropathy noted in bilateral feet. Voiding adequately. Drsg CDI with hemovac intact to suction.Sl'd. Mod CHO diet, Carb Count, and self administering insulin pump. Glucose 140 tonight.

## 2018-10-03 NOTE — PROGRESS NOTES
"[facility]  Hospitalist Progress Note  Remi Pittman, DO 10/03/2018    Reason for Stay (Diagnosis): Left total knee arthroplasty.         Assessment and Plan:      Summary of Stay: Tika Martinez is a 63 year old female admitted on 10/2/2018 with left total knee arthroplasty.  Problem List:   1. Left total knee arthroplasty.  Pain medications as needed.  Add hydroxyzine 25-50 mg every 6 hours as needed for pain.  Use incentive spirometry.  Work with therapy.  2. Diabetes mellitus.  Continue insulin pump.  3. Hypotension.  Continue midodrine.    4. Asthma.  No acute exacerbation.  Continue montelukast, fluticasone, and albuterol as needed.  5. GERD.  Continue pantoprazole.  6. Depression.  Continue bupropion.  7. Hyperlipidemia.  Continue atorvastatin.  DVT Prophylaxis: Rivaroxaban.  Code Status: Full Code  Discharge Dispo: Per orthopedic surgery.  Estimated Disch Date / # of Days until Disch: Per orthopedic surgery.        Interval History (Subjective):      Having left knee pain.  Rates pain as a 7/10.  Denies chest pain, shortness of breath, fevers, chills, nausea, vomiting, or diarrhea.                  Physical Exam:      Last Vital Signs:  /66  Pulse 84  Temp 100.2  F (37.9  C)  Resp 18  Ht 1.676 m (5' 6\")  Wt 70.8 kg (156 lb)  SpO2 94%  BMI 25.18 kg/m2    Gen:  NAD, A&Ox3.  Eyes:  PERRL, sclera anicteric.  OP:  MMM, no lesions.  Neck:  Supple.  CV:  Regular, no murmurs.  Lung:  CTA b/l, normal effort.  Ab:  +BS, soft.  Skin:  Warm, dry to touch.  No rash.  Ext:  No pitting edema LE b/l.           Medications:      All current medications were reviewed with changes reflected in problem list.         Data:      All new lab and imaging data was reviewed.   Labs:    Recent Labs  Lab 10/03/18  0737      POTASSIUM 3.9   CHLORIDE 108   CO2 24   ANIONGAP 6   *   BUN 20   CR 1.32*   GFRESTIMATED 41*   GFRESTBLACK 49*   KEMAL 7.9*       Recent Labs  Lab 10/03/18  0737   HGB " 9.3*      Imaging:   No results found for this or any previous visit (from the past 24 hour(s)).

## 2018-10-03 NOTE — PLAN OF CARE
Problem: Patient Care Overview  Goal: Plan of Care/Patient Progress Review  Discharge Planner PT   Patient plan for discharge: Home with OP PT  Current status: Pt reporting 7/10 pain prior to trialing movement. Min A needed for SLR; recommend KI at this time. Supine to sit with SBA. Sit to/from stand with CGA. Pt ambulates 125' with FWW, KI and CGA. Able to progress to step through gait with cues. L knee AAROM 10-40 degrees.   Barriers to return to prior living situation: None anticipated.   Recommendations for discharge: Home with OP PT.  Rationale for recommendations: Improved mobility tolerance despite pain levels this AM. Will continue IP PT to address L LE strength/AROM deficits in order to maximize independence prior to discharge.        Entered by: Kumar Mancilla 10/03/2018 9:24 AM

## 2018-10-04 ENCOUNTER — APPOINTMENT (OUTPATIENT)
Dept: PHYSICAL THERAPY | Facility: CLINIC | Age: 63
End: 2018-10-04
Attending: ORTHOPAEDIC SURGERY
Payer: COMMERCIAL

## 2018-10-04 ENCOUNTER — APPOINTMENT (OUTPATIENT)
Dept: OCCUPATIONAL THERAPY | Facility: CLINIC | Age: 63
End: 2018-10-04
Attending: ORTHOPAEDIC SURGERY
Payer: COMMERCIAL

## 2018-10-04 LAB
GLUCOSE BLDC GLUCOMTR-MCNC: 129 MG/DL (ref 70–99)
GLUCOSE BLDC GLUCOMTR-MCNC: 129 MG/DL (ref 70–99)
GLUCOSE BLDC GLUCOMTR-MCNC: 136 MG/DL (ref 70–99)
GLUCOSE BLDC GLUCOMTR-MCNC: 162 MG/DL (ref 70–99)
GLUCOSE BLDC GLUCOMTR-MCNC: 183 MG/DL (ref 70–99)
HGB BLD-MCNC: 9.1 G/DL (ref 11.7–15.7)

## 2018-10-04 PROCEDURE — 94640 AIRWAY INHALATION TREATMENT: CPT

## 2018-10-04 PROCEDURE — 40000133 ZZH STATISTIC OT WARD VISIT: Performed by: STUDENT IN AN ORGANIZED HEALTH CARE EDUCATION/TRAINING PROGRAM

## 2018-10-04 PROCEDURE — 97116 GAIT TRAINING THERAPY: CPT | Mod: GP | Performed by: PHYSICAL THERAPY ASSISTANT

## 2018-10-04 PROCEDURE — 99232 SBSQ HOSP IP/OBS MODERATE 35: CPT | Performed by: INTERNAL MEDICINE

## 2018-10-04 PROCEDURE — 40000193 ZZH STATISTIC PT WARD VISIT: Performed by: PHYSICAL THERAPY ASSISTANT

## 2018-10-04 PROCEDURE — 36415 COLL VENOUS BLD VENIPUNCTURE: CPT | Performed by: ORTHOPAEDIC SURGERY

## 2018-10-04 PROCEDURE — 99253 IP/OBS CNSLTJ NEW/EST LOW 45: CPT | Performed by: NURSE PRACTITIONER

## 2018-10-04 PROCEDURE — 25000132 ZZH RX MED GY IP 250 OP 250 PS 637: Performed by: ORTHOPAEDIC SURGERY

## 2018-10-04 PROCEDURE — 25000132 ZZH RX MED GY IP 250 OP 250 PS 637: Performed by: NURSE PRACTITIONER

## 2018-10-04 PROCEDURE — 85018 HEMOGLOBIN: CPT | Performed by: ORTHOPAEDIC SURGERY

## 2018-10-04 PROCEDURE — 00000146 ZZHCL STATISTIC GLUCOSE BY METER IP

## 2018-10-04 PROCEDURE — 12000000 ZZH R&B MED SURG/OB

## 2018-10-04 PROCEDURE — 97530 THERAPEUTIC ACTIVITIES: CPT | Mod: GP | Performed by: PHYSICAL THERAPY ASSISTANT

## 2018-10-04 PROCEDURE — 97110 THERAPEUTIC EXERCISES: CPT | Mod: GP | Performed by: PHYSICAL THERAPY ASSISTANT

## 2018-10-04 PROCEDURE — 40000275 ZZH STATISTIC RCP TIME EA 10 MIN

## 2018-10-04 PROCEDURE — 97535 SELF CARE MNGMENT TRAINING: CPT | Mod: GO | Performed by: STUDENT IN AN ORGANIZED HEALTH CARE EDUCATION/TRAINING PROGRAM

## 2018-10-04 PROCEDURE — 25000132 ZZH RX MED GY IP 250 OP 250 PS 637: Performed by: INTERNAL MEDICINE

## 2018-10-04 RX ORDER — POLYETHYLENE GLYCOL 3350 17 G/17G
17 POWDER, FOR SOLUTION ORAL DAILY PRN
Status: DISCONTINUED | OUTPATIENT
Start: 2018-10-04 | End: 2018-10-05 | Stop reason: HOSPADM

## 2018-10-04 RX ORDER — LIDOCAINE 4 G/G
1 PATCH TOPICAL
Status: DISCONTINUED | OUTPATIENT
Start: 2018-10-04 | End: 2018-10-05 | Stop reason: HOSPADM

## 2018-10-04 RX ORDER — OXYCODONE HYDROCHLORIDE 5 MG/1
10-15 TABLET ORAL
Status: DISCONTINUED | OUTPATIENT
Start: 2018-10-04 | End: 2018-10-05 | Stop reason: HOSPADM

## 2018-10-04 RX ADMIN — HYDROXYZINE HYDROCHLORIDE 25 MG: 25 TABLET ORAL at 21:29

## 2018-10-04 RX ADMIN — MIRTAZAPINE 45 MG: 15 TABLET, FILM COATED ORAL at 21:32

## 2018-10-04 RX ADMIN — OXYCODONE HYDROCHLORIDE 10 MG: 5 TABLET ORAL at 07:54

## 2018-10-04 RX ADMIN — TOPIRAMATE 200 MG: 100 TABLET, FILM COATED ORAL at 07:50

## 2018-10-04 RX ADMIN — OXYCODONE HYDROCHLORIDE 15 MG: 5 TABLET ORAL at 17:28

## 2018-10-04 RX ADMIN — ATORVASTATIN CALCIUM 40 MG: 40 TABLET, FILM COATED ORAL at 21:32

## 2018-10-04 RX ADMIN — BUPROPION HYDROCHLORIDE 300 MG: 150 TABLET, FILM COATED, EXTENDED RELEASE ORAL at 07:50

## 2018-10-04 RX ADMIN — OXYCODONE HYDROCHLORIDE 10 MG: 5 TABLET ORAL at 10:52

## 2018-10-04 RX ADMIN — ALLOPURINOL 200 MG: 100 TABLET ORAL at 21:33

## 2018-10-04 RX ADMIN — MULTIPLE VITAMINS W/ MINERALS TAB 1 TABLET: TAB at 07:50

## 2018-10-04 RX ADMIN — LORATADINE 10 MG: 10 TABLET ORAL at 07:50

## 2018-10-04 RX ADMIN — HYDROXYZINE HYDROCHLORIDE 25 MG: 25 TABLET ORAL at 13:59

## 2018-10-04 RX ADMIN — MIDODRINE HYDROCHLORIDE 5 MG: 5 TABLET ORAL at 06:13

## 2018-10-04 RX ADMIN — MONTELUKAST SODIUM 10 MG: 10 TABLET, FILM COATED ORAL at 21:33

## 2018-10-04 RX ADMIN — MIDODRINE HYDROCHLORIDE 5 MG: 5 TABLET ORAL at 12:00

## 2018-10-04 RX ADMIN — ACETAMINOPHEN 975 MG: 325 TABLET, FILM COATED ORAL at 13:13

## 2018-10-04 RX ADMIN — HYDROXYZINE HYDROCHLORIDE 25 MG: 25 TABLET ORAL at 00:45

## 2018-10-04 RX ADMIN — CHOLECALCIFEROL CAP 125 MCG (5000 UNIT) 5000 UNITS: 125 CAP at 07:50

## 2018-10-04 RX ADMIN — TOPIRAMATE 200 MG: 100 TABLET, FILM COATED ORAL at 21:33

## 2018-10-04 RX ADMIN — LIDOCAINE 1 PATCH: 560 PATCH PERCUTANEOUS; TOPICAL; TRANSDERMAL at 21:33

## 2018-10-04 RX ADMIN — OXYCODONE HYDROCHLORIDE 10 MG: 5 TABLET ORAL at 03:58

## 2018-10-04 RX ADMIN — SENNOSIDES AND DOCUSATE SODIUM 2 TABLET: 8.6; 5 TABLET ORAL at 07:50

## 2018-10-04 RX ADMIN — FLUTICASONE FUROATE 1 PUFF: 100 POWDER RESPIRATORY (INHALATION) at 07:24

## 2018-10-04 RX ADMIN — ACETAMINOPHEN 975 MG: 325 TABLET, FILM COATED ORAL at 06:13

## 2018-10-04 RX ADMIN — OXYCODONE HYDROCHLORIDE AND ACETAMINOPHEN 500 MG: 500 TABLET ORAL at 07:50

## 2018-10-04 RX ADMIN — OXYCODONE HYDROCHLORIDE 10 MG: 5 TABLET ORAL at 00:45

## 2018-10-04 RX ADMIN — ACETAMINOPHEN 975 MG: 325 TABLET, FILM COATED ORAL at 21:33

## 2018-10-04 RX ADMIN — PANTOPRAZOLE SODIUM 20 MG: 20 TABLET, DELAYED RELEASE ORAL at 07:49

## 2018-10-04 RX ADMIN — Medication: at 22:10

## 2018-10-04 RX ADMIN — ROPINIROLE HYDROCHLORIDE 1.5 MG: 1 TABLET, FILM COATED ORAL at 12:00

## 2018-10-04 RX ADMIN — SENNOSIDES AND DOCUSATE SODIUM 2 TABLET: 8.6; 5 TABLET ORAL at 21:32

## 2018-10-04 RX ADMIN — MIDODRINE HYDROCHLORIDE 5 MG: 5 TABLET ORAL at 17:18

## 2018-10-04 RX ADMIN — OXYCODONE HYDROCHLORIDE 15 MG: 5 TABLET ORAL at 21:32

## 2018-10-04 RX ADMIN — RIVAROXABAN 10 MG: 10 TABLET, FILM COATED ORAL at 07:50

## 2018-10-04 RX ADMIN — HYDROXYZINE HYDROCHLORIDE 25 MG: 25 TABLET ORAL at 07:54

## 2018-10-04 RX ADMIN — OXYCODONE HYDROCHLORIDE 10 MG: 5 TABLET ORAL at 13:59

## 2018-10-04 RX ADMIN — BUDESONIDE 9 MG: 3 CAPSULE ORAL at 07:50

## 2018-10-04 ASSESSMENT — ACTIVITIES OF DAILY LIVING (ADL)
ADLS_ACUITY_SCORE: 14
ADLS_ACUITY_SCORE: 13
ADLS_ACUITY_SCORE: 15
ADLS_ACUITY_SCORE: 13

## 2018-10-04 NOTE — PROGRESS NOTES
"[facility]  Hospitalist Progress Note  Remi Pittman, DO 10/04/2018    Reason for Stay (Diagnosis): Left TKA.         Assessment and Plan:      Summary of Stay: Tika Martinez is a 63 year old female admitted on 10/2/2018 with left total knee arthroplasty.  Problem List:   1. Left total knee arthroplasty.  Pain medications as needed.  Ask pain team to assist with pain management.  Use incentive spirometry.  Work with therapy.  2. Diabetes mellitus.  Continue insulin pump.  3. Orthostatic Hypotension.  Continue midodrine.    4. Asthma.  No acute exacerbation.  Continue montelukast, fluticasone, and albuterol as needed.  5. GERD.  Continue pantoprazole.  6. Depression.  Continue bupropion.  7. Hyperlipidemia.  Continue atorvastatin.  8. Chronic kidney disease.  Creatinine at baseline.  Avoid nephrotoxins as able.  DVT Prophylaxis: Rivaroxaban.  Code Status: Full Code  Discharge Dispo: Per orthopedic surgery.  Estimated Disch Date / # of Days until Disch: Per orthopedic surgery.        Interval History (Subjective):      Having lots of left knee pain.  Denies chest pain, shortness of breath, fevers, chills, nausea, vomiting, or diarrhea.                  Physical Exam:      Last Vital Signs:  /68  Pulse 84  Temp 98.7  F (37.1  C) (Oral)  Resp 16  Ht 1.676 m (5' 6\")  Wt 70.8 kg (156 lb)  SpO2 95%  BMI 25.18 kg/m2    Gen:  NAD, A&Ox3.  Eyes:  PERRL, sclera anicteric.  OP:  MMM, no lesions.  Neck:  Supple.  CV:  Regular, no murmurs.  Lung:  CTA b/l, normal effort.  Ab:  +BS, soft.  Skin:  Warm, dry to touch.  No rash.  Ext:  No pitting edema LE b/l.           Medications:      All current medications were reviewed with changes reflected in problem list.         Data:      All new lab and imaging data was reviewed.   Labs:    Recent Labs  Lab 10/03/18  0737      POTASSIUM 3.9   CHLORIDE 108   CO2 24   ANIONGAP 6   *   BUN 20   CR 1.32*   GFRESTIMATED 41*   GFRESTBLACK 49* "   KEMAL 7.9*       Recent Labs  Lab 10/04/18  0701   HGB 9.1*      Imaging:   No results found for this or any previous visit (from the past 24 hour(s)).

## 2018-10-04 NOTE — DISCHARGE INSTRUCTIONS
Return to clinic in 10-14 days.  Call [phone] to schedule or if you experience any problems before your scheduled appointment.      1. Do exercises at home as instructed by therapist twice a day. Continue outpatient therapy as ordered by your doctor.  2. Ice knee after exercises and therapy.  3. Examine dressing daily for problems.  4. May shower, can get dressing wet, no soaking (no bathtubs, pools or hot tubs)  5. Notify your dentist or physician of your implant so you can get antibiotics before any dental work or before any invasive procedure (ie: colonoscopy)  6. Remove anti-embolism stockings (Duncan hose) for 30 minutes twice daily.      Aquacel dressing:  DO NOT CHANGE DRESSING DAILY.  Leave this dressing on until follow-up appointment with Orthopedic Surgeon.  Dressing is waterproof, can shower with it on, pat dry when done.  No soaking such as in tub baths, pools or hot tubs        While on narcotic pain medication, to prevent constipation:  1. Drink plenty of water to keep well hydrated   2. May take over the counter Colace or Senna (follow instructions on label)        Call your physician if: ([phone])   1. Persistent fever greater than 101 degrees with body chills or excessive sweating.  2. Increased/persistent redness, localized warmth, tenderness, drainage or swelling at dressing site. Greater than 50% drainage on dressing.   3. Persistent pain not controlled with oral pain medications, ice and rest.   4. No bowel movement in 3 days (may use Milk of Magnesia or other over the counter remedy).  5. Chest pain, shortness of breath, and/or calf pain with excessive swelling.  6. Generalized feeling of illness such as nausea/vomitng and/or lightheaded/dizziness  7. Any other questions or concerns related to your surgery/recovery.    Thank you for allowing [facility] to participate in your cares!!    Opioid Medication Information    You have been given a prescription for an opioid  (narcotic) pain medicine and/or have received a pain medicine. These medicines can make you drowsy or impaired. You must not drive, operate dangerous equipment, or engage in any other dangerous activities while taking these medications. If you drive while taking these medications, you could be arrested for DUI, or driving under the influence. Do not drink any alcohol while you are taking these medications.   Opioid pain medications can cause addiction. If you have a history of chemical dependency of any type, you are at a higher risk of becoming addicted to pain medications.  Only take these prescribed medications to treat your pain when all other options have been tried. Take it for as short a time and as few doses as possible. Store your pain pills in a secure place, as they are frequently stolen and provide a dangerous opportunity for children or visitors in your house to start abusing these powerful medications. We will not replace any lost or stolen medicine.  As soon as your pain is better, you should seek out a drug take back program (see your local police department) to dispose of them.   Over-the-counter medications and prescription drugs can pollute cohen and be harmful to humans, fish, and other wildlife when disposed of improperly -- do not flush medications down the toilet or place in the trash.  Properly disposing of medicines is important to prevent abuse or poisoning and protect the environment.     Prescription and over-the-counter medications are collected anonymously from residents for free at [facility] drop-off locations. Visit the [facility]'s Office Prescription Drug Drop-Off page for the list of drop-off sites and information about the program.       [place]  Police Department [phone] Mon-Fri  8am to 4:30pm     [place]  Police Department [phone] 24hrs a day    [place]  Police Department [phone] Mon-Fri  8am to 6:00pm     [facility]  [phone] Mon-Fri  8am to 4:30pm     [place]  Police Department [phone] 24hrs a day      [facility] Department [phone] Mon-Fri  8am to 4:30pm   Many prescription pain medications contain Tylenol  (acetaminophen), including Vicodin , Tylenol #3 , Norco , Lortab , and Percocet .  You should not take any extra pills of Tylenol  if you are using these prescription medications or you can get very sick.  Do not ever take more than 3000 mg of acetaminophen in any 24 hour period.  All opioids tend to cause constipation. Drink plenty of water and eat foods that have a lot of fiber, such as fruits, vegetables, prune juice, apple juice and high fiber cereal.  Take a laxative if you don t move your bowels at least every other day. Miralax , Milk of Magnesia, Colace , or Senna  can be used to keep you regular.  You will likely need to continue stool softeners and stimulants while taking opioids.     Understanding Opioid Medicines for Pain Management  Opioids are medicines that can help reduce pain. They are stronger than most over-the-counter pain relievers and must be prescribed by a healthcare provider. They can be used to treat both acute and chronic pain that ranges from moderate to severe. While opioids can be safe and effective when used correctly, they do come with serious risks and side effects. For this reason, they should only be considered as an option if other medicines or treatments have not done enough to relieve or manage pain.   What is pain?  Pain is your body s way of telling you something is wrong. It makes you pull your hand away from a flame or avoid walking on an injured leg. Pain starts in receptor cells found beneath the skin and in organs throughout the body. When you are sick or injured, these receptor cells send signals along nerve pathways to the spinal cord, which then send the signals to the brain. The brain interprets the signals as pain. In response, it sends  back signals to protect the body. The brain also releases its own natural painkillers called endorphins to help reduce pain. Once the source of the pain heals, the pain usually goes away.   Types of pain  Pain can one be of two types: acute or chronic. Both types respond to treatment.    Acute pain typically lasts fewer than 3 months. It goes away when the cause is treated. Common causes of acute pain include injury or illness. Surgery can lead to short-term pain during healing. And women experience acute pain during and after childbirth. In some cases, acute pain can lead to chronic pain over time.    Chronic pain typically lasts longer than 3 months. This includes pain that comes and goes or that is continuous. Chronic pain may be due to an ongoing health problem, such as arthritis. Or it may linger after an injury that has healed, such as a broken bone. Problems with the body s pain-control system may also lead to chronic pain. Sometimes, chronic pain can occur with no clear cause.   The pain cycle  Pain can affect all aspects of your life--for example, sleep, mood, activity, and energy level are all affected by pain. Being tired, depressed, and inactive make the pain worse and harder to cope with. This leads to a cycle of pain.  How opioids work  Opioids work by attaching to special receptors found in the brain, spinal cord, and other organs. When opioids attach to these receptors, they can block or suppress how you feel pain. Opioids can also make you feel good or relaxed by affecting areas of the brain that produce feelings of pleasure.   Types of opioids  There are two types of opioids: short-acting/immediate-release (SA/IR) and long-acting/extended-release (LA/ER). Short-acting opioids work faster than long-acting opioids but give pain relief for only short periods. Long-acting opioids work slower than short-acting opioids but give pain-relief for longer periods. Many current opioids come in both short- and  long-acting formulas. Some examples of opioids include:    Codeine with acetaminophen    Fentanyl    Hydrocodone (with or without acetaminophen)    Hydromorphone    Meperidine    Methadone    Morphine    Oxycodone (with or without acetaminophen)    Oxycodone with naloxone    Tramadol  If you are prescribed opioids, you will usually be started on a short-acting type at the lowest dosage. The dosage may then be adjusted as needed based on your response to the medicine and further follow-up with your healthcare provider. If appropriate, you may transition to using a long-acting type opioid. In some cases, you may be prescribed both types of opioids to help manage different types of pain. Any changes or adjustments will also depend on your level of pain tolerance and tolerance of side effects.     Studies show that opioids provide short-term benefits for moderate to severe pain. But the benefits of long-term use of opioids for treating pain remain unclear. In general, you should only remain on opioids if they continue to improve pain and function without increasing the risks to your health.   How opioids are given  Most opioids are taken by mouth. They often come in pill form, but some may come in the form of liquids and even sweetened lozenges. Certain opioids also may be injected under the skin, into a muscle, or into a vein. Or they may be absorbed through the skin via a patch.     Know your options  Keep in mind that opioids are not the only option for treating pain. Non-opioid options may work just as well and have fewer risks and side effects. Non-opioid options can include:     Other pain relievers, such as acetaminophen or nonsteroidal anti-inflammatory drugs (NSAIDs) such as ibuprofen or naproxen    Other classes of medicines such as anticonvulsants, antidepressants, and muscle relaxers     Exercise and physical therapy     Cognitive behavioral therapy, which can help you learn different ways to respond and  cope with pain     Mind/body therapies such as deep breathing, distraction, visualization, meditation, or biofeedback     Complementary therapies such as massage, acupuncture and acupressure, or chiropractic care     Various procedures, such as transcutaneous electrical nerve stimulation (TENS), implantation of a spinal pump, and nerve ablation    Don't take opioids in combination with benzodiazepines. Serious risks are associated with combining opioids with benzodiazepines. These risks include extreme sleepiness, slowed breathing, and death. Let your healthcare provider know if you are taking benzodiazepines.   Date Last Reviewed: 8/1/2017 2000-2017 [employer]. [address] [place] [id number]. All rights reserved. This information is not intended as a substitute for professional medical care. Always follow your healthcare professional's instructions.
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
## 2018-10-04 NOTE — PLAN OF CARE
Problem: Patient Care Overview  Goal: Plan of Care/Patient Progress Review  A/Ox4, sleeping comfortably, VSS: T.100 and 99.2, asystematic,up with Ax1, gbelt/KIand walker to bathroom. CMS: Baseline numbness BLE, Drsg: CDI. Pain managed PO pain medications. Voiding good amounts. Repositioning and IS encouraged. Passing flatus. Nursing continue to monitor.

## 2018-10-04 NOTE — PLAN OF CARE
Problem: Knee Arthroplasty (Total, Partial) (Adult)  Goal: Signs and Symptoms of Listed Potential Problems Will be Absent, Minimized or Managed (Knee Arthroplasty)  Signs and symptoms of listed potential problems will be absent, minimized or managed by discharge/transition of care (reference Knee Arthroplasty (Total, Partial) (Adult) CPG).   Outcome: Improving  Temp max in the 99's last shift, normal this shift, vss, cms intact except for her neuropathy, left foot swelling improving after ace wrap removed this am. Aquacel dressing clean, dry and intact. Her knee not too swollen or bruised. Pain ratings still 6-7 range, hospitalist did consult pain team to see if they could help manage it better. Up with walker and immobilizer and assist of 1. Hgb 9.1 this am, pt does c/o of fatigue. Still planning on discharge to home tomorrow.

## 2018-10-04 NOTE — PLAN OF CARE
Problem: Patient Care Overview  Goal: Plan of Care/Patient Progress Review  Discharge Planner PT   Patient plan for discharge: home tomorrow  Current status: still needs KI at this time unable to Ind SLR up and down 4 steps and gait to 100 feet this AM S to CGA for bed mobility  Barriers to return to prior living situation: none anticiapted  Recommendations for discharge: : Home with OP PT per plan established by the PT.    Rationale for recommendations: anticipate will met PT goals for home tomorrow after AM session        Entered by: Renee Tarango 10/04/2018 11:56 AM

## 2018-10-04 NOTE — PROGRESS NOTES
[facility]    Orthopedics Progress Note     Assessment & Plan   Tika Martinez is a 63 year old female who was admitted on 10/2/2018 for left TKA. Plan is to continue therapy, pain control, and VTE prophylaxis. Anticipate discharge tomorrow.       Disposition Plan   Expected discharge: Tomorrow, recommended to prior living arrangement.     Entered: Jeffery Estrella 10/04/2018, 12:15 PM   Information in the above section will display in the discharge planner report.    PAMELA Jordan    Interval History   Patient had no major issues overnight. She does note pain of 6-7/10 at rest.     Physical Exam   Temp: 98.7  F (37.1  C) Temp src: Oral BP: 131/68   Heart Rate: 95 Resp: 16 SpO2: 95 % O2 Device: None (Room air)    Vitals:    09/17/18 1503 10/02/18 0554   Weight: 71.2 kg (157 lb) 70.8 kg (156 lb)     Vital Signs with Ranges  Temp:  [98.7  F (37.1  C)-100  F (37.8  C)] 98.7  F (37.1  C)  Heart Rate:  [88-96] 95  Resp:  [14-16] 16  BP: (131-151)/(63-70) 131/68  SpO2:  [93 %-99 %] 95 %  I/O last 3 completed shifts:  In: 600 [P.O.:600]  Out: 390 [Urine:350; Drains:40]    Dressing c/d/i. Calves soft. NV intact distally.     Medications     insulin basal rate for inpatient ambulatory pump       lactated ringers 125 mL/hr at 10/02/18 2139       acetaminophen  975 mg Oral Q8H     allopurinol (ZYLOPRIM) tablet 200 mg  200 mg Oral At Bedtime     ascorbic acid  500 mg Oral Daily     atorvastatin (LIPITOR) tablet 40 mg  40 mg Oral At Bedtime     budesonide  9 mg Oral QAM     buPROPion  300 mg Oral QAM     cholecalciferol  5,000 Units Oral Daily     fluticasone furoate  1 puff Inhalation Daily     insulin bolus from AMBULATORY PUMP   Subcutaneous 4x Daily AC & HS     insulin bolus from AMBULATORY PUMP   Subcutaneous TID AC     loratadine  10 mg Oral Daily     midodrine  5 mg Oral TID     mirtazapine (REMERON) tablet 45 mg  45 mg Oral At Bedtime     montelukast (SINGULAIR) tablet 10 mg  10 mg Oral  At Bedtime     multivitamin, therapeutic with minerals  1 tablet Oral Daily     pantoprazole (PROTONIX) EC tablet 20 mg  20 mg Oral QAM AC     rivaroxaban ANTICOAGULANT  10 mg Oral Daily     rOPINIRole (REQUIP) tablet 1.5 mg  1.5 mg Oral Daily with lunch     senna-docusate  1 tablet Oral BID    Or     senna-docusate  2 tablet Oral BID     sodium chloride (PF)  3 mL Intracatheter Q8H     topiramate (TOPAMAX) tablet 200 mg  200 mg Oral BID       Data     Recent Labs  Lab 10/04/18  0701 10/03/18  0737   HGB 9.1* 9.3*   NA  --  138   POTASSIUM  --  3.9   CHLORIDE  --  108   CO2  --  24   BUN  --  20   CR  --  1.32*   ANIONGAP  --  6   KEMAL  --  7.9*   GLC  --  165*

## 2018-10-04 NOTE — PLAN OF CARE
Problem: Patient Care Overview  Goal: Plan of Care/Patient Progress Review  A&Ox4. T: 99.4F and 99.8F, encouraged use of IS and given scheduled Tylenol. VSS otherwise. RA. Baseline numbness/tingling in BLE, moderate edema to LLE. CMS intact otherwise. Dressing CDI. Up w/ Ax1, gait belt, walker and KI. Ambulated in hallway, tolerated well. Glucose checks. Pt. has insulin pump, self-managed. BS active x4, tolerating MOD/CHO diet, passing flatus. Voiding in adequate amts. Pain managed w/ scheduled po Tylenol and prn po Oxycodone, Atarax and IV Dilaudid. DC to home when able. Will continue to monitor.

## 2018-10-04 NOTE — PLAN OF CARE
Problem: Patient Care Overview  Goal: Plan of Care/Patient Progress Review    Discharge Planner OT   Patient plan for discharge: return home with  assist  Current status: Pt able to follow cues and instructions for LE dressing tasks, completed SBA with exception of KI, Min A provided; able to complete sit<>stand transfers and functional mobility in room with FWW, SBA; pt agreed to set-up of toilet to simulate home environment, completed transfer, FWW, SBA following cues for hand placement to simulate home set-up; reported fatigue, educated on EC techniques to implement into daily routines during recovery  Barriers to return to prior living situation: pain, fatigue  Recommendations for discharge: anticipate pt able to return home with  assist for transportation and household IADLs  Rationale for recommendations: continued OT services as pt below baseline independence with dressing, bathing, and toilet tasks       Entered by: Mary Hollins 10/04/2018 3:00 PM

## 2018-10-05 ENCOUNTER — APPOINTMENT (OUTPATIENT)
Dept: PHYSICAL THERAPY | Facility: CLINIC | Age: 63
End: 2018-10-05
Attending: ORTHOPAEDIC SURGERY
Payer: COMMERCIAL

## 2018-10-05 ENCOUNTER — APPOINTMENT (OUTPATIENT)
Dept: OCCUPATIONAL THERAPY | Facility: CLINIC | Age: 63
End: 2018-10-05
Attending: ORTHOPAEDIC SURGERY
Payer: COMMERCIAL

## 2018-10-05 VITALS
WEIGHT: 156 LBS | HEIGHT: 66 IN | HEART RATE: 75 BPM | RESPIRATION RATE: 16 BRPM | BODY MASS INDEX: 25.07 KG/M2 | SYSTOLIC BLOOD PRESSURE: 132 MMHG | OXYGEN SATURATION: 92 % | DIASTOLIC BLOOD PRESSURE: 59 MMHG | TEMPERATURE: 98.7 F

## 2018-10-05 LAB
GLUCOSE BLDC GLUCOMTR-MCNC: 166 MG/DL (ref 70–99)
GLUCOSE BLDC GLUCOMTR-MCNC: 173 MG/DL (ref 70–99)
GLUCOSE BLDC GLUCOMTR-MCNC: 186 MG/DL (ref 70–99)

## 2018-10-05 PROCEDURE — 97530 THERAPEUTIC ACTIVITIES: CPT | Mod: GO | Performed by: STUDENT IN AN ORGANIZED HEALTH CARE EDUCATION/TRAINING PROGRAM

## 2018-10-05 PROCEDURE — 97110 THERAPEUTIC EXERCISES: CPT | Mod: GP | Performed by: PHYSICAL THERAPY ASSISTANT

## 2018-10-05 PROCEDURE — 99232 SBSQ HOSP IP/OBS MODERATE 35: CPT | Performed by: INTERNAL MEDICINE

## 2018-10-05 PROCEDURE — 25000132 ZZH RX MED GY IP 250 OP 250 PS 637: Performed by: ORTHOPAEDIC SURGERY

## 2018-10-05 PROCEDURE — 97535 SELF CARE MNGMENT TRAINING: CPT | Mod: GO | Performed by: STUDENT IN AN ORGANIZED HEALTH CARE EDUCATION/TRAINING PROGRAM

## 2018-10-05 PROCEDURE — 94640 AIRWAY INHALATION TREATMENT: CPT

## 2018-10-05 PROCEDURE — 25000132 ZZH RX MED GY IP 250 OP 250 PS 637: Performed by: INTERNAL MEDICINE

## 2018-10-05 PROCEDURE — 97530 THERAPEUTIC ACTIVITIES: CPT | Mod: GP | Performed by: PHYSICAL THERAPY ASSISTANT

## 2018-10-05 PROCEDURE — 25000128 H RX IP 250 OP 636: Performed by: ORTHOPAEDIC SURGERY

## 2018-10-05 PROCEDURE — 25000132 ZZH RX MED GY IP 250 OP 250 PS 637: Performed by: NURSE PRACTITIONER

## 2018-10-05 PROCEDURE — 40000275 ZZH STATISTIC RCP TIME EA 10 MIN

## 2018-10-05 PROCEDURE — 97116 GAIT TRAINING THERAPY: CPT | Mod: GP | Performed by: PHYSICAL THERAPY ASSISTANT

## 2018-10-05 PROCEDURE — 40000133 ZZH STATISTIC OT WARD VISIT: Performed by: STUDENT IN AN ORGANIZED HEALTH CARE EDUCATION/TRAINING PROGRAM

## 2018-10-05 PROCEDURE — 00000146 ZZHCL STATISTIC GLUCOSE BY METER IP

## 2018-10-05 PROCEDURE — 40000193 ZZH STATISTIC PT WARD VISIT: Performed by: PHYSICAL THERAPY ASSISTANT

## 2018-10-05 RX ORDER — OXYCODONE HYDROCHLORIDE 10 MG/1
10-15 TABLET ORAL
Qty: 60 TABLET | Refills: 0 | Status: SHIPPED | OUTPATIENT
Start: 2018-10-05 | End: 2022-08-16

## 2018-10-05 RX ORDER — ACETAMINOPHEN 325 MG/1
650 TABLET ORAL EVERY 4 HOURS PRN
Qty: 100 TABLET | Refills: 1 | Status: SHIPPED | OUTPATIENT
Start: 2018-10-05

## 2018-10-05 RX ADMIN — RIVAROXABAN 10 MG: 10 TABLET, FILM COATED ORAL at 08:23

## 2018-10-05 RX ADMIN — OXYCODONE HYDROCHLORIDE 15 MG: 5 TABLET ORAL at 12:58

## 2018-10-05 RX ADMIN — ACETAMINOPHEN 975 MG: 325 TABLET, FILM COATED ORAL at 06:38

## 2018-10-05 RX ADMIN — FLUTICASONE FUROATE 1 PUFF: 100 POWDER RESPIRATORY (INHALATION) at 07:27

## 2018-10-05 RX ADMIN — MIDODRINE HYDROCHLORIDE 5 MG: 5 TABLET ORAL at 06:38

## 2018-10-05 RX ADMIN — ROPINIROLE HYDROCHLORIDE 1.5 MG: 1 TABLET, FILM COATED ORAL at 11:58

## 2018-10-05 RX ADMIN — CHOLECALCIFEROL CAP 125 MCG (5000 UNIT) 5000 UNITS: 125 CAP at 08:22

## 2018-10-05 RX ADMIN — Medication: at 08:26

## 2018-10-05 RX ADMIN — OXYCODONE HYDROCHLORIDE 15 MG: 5 TABLET ORAL at 00:46

## 2018-10-05 RX ADMIN — ACETAMINOPHEN 975 MG: 325 TABLET, FILM COATED ORAL at 13:04

## 2018-10-05 RX ADMIN — OXYCODONE HYDROCHLORIDE 15 MG: 5 TABLET ORAL at 03:37

## 2018-10-05 RX ADMIN — POLYETHYLENE GLYCOL 3350 17 G: 17 POWDER, FOR SOLUTION ORAL at 11:59

## 2018-10-05 RX ADMIN — BUDESONIDE 9 MG: 3 CAPSULE ORAL at 08:23

## 2018-10-05 RX ADMIN — TOPIRAMATE 200 MG: 100 TABLET, FILM COATED ORAL at 08:23

## 2018-10-05 RX ADMIN — OXYCODONE HYDROCHLORIDE 15 MG: 5 TABLET ORAL at 06:56

## 2018-10-05 RX ADMIN — PANTOPRAZOLE SODIUM 20 MG: 20 TABLET, DELAYED RELEASE ORAL at 06:38

## 2018-10-05 RX ADMIN — MULTIPLE VITAMINS W/ MINERALS TAB 1 TABLET: TAB at 08:22

## 2018-10-05 RX ADMIN — OXYCODONE HYDROCHLORIDE 15 MG: 5 TABLET ORAL at 10:03

## 2018-10-05 RX ADMIN — Medication 1 MG: at 00:46

## 2018-10-05 RX ADMIN — SENNOSIDES AND DOCUSATE SODIUM 2 TABLET: 8.6; 5 TABLET ORAL at 08:23

## 2018-10-05 RX ADMIN — Medication 0.5 MG: at 09:18

## 2018-10-05 RX ADMIN — OXYCODONE HYDROCHLORIDE AND ACETAMINOPHEN 500 MG: 500 TABLET ORAL at 08:23

## 2018-10-05 RX ADMIN — LORATADINE 10 MG: 10 TABLET ORAL at 08:22

## 2018-10-05 RX ADMIN — BUPROPION HYDROCHLORIDE 300 MG: 150 TABLET, FILM COATED, EXTENDED RELEASE ORAL at 08:23

## 2018-10-05 RX ADMIN — MIDODRINE HYDROCHLORIDE 5 MG: 5 TABLET ORAL at 11:58

## 2018-10-05 ASSESSMENT — ACTIVITIES OF DAILY LIVING (ADL)
ADLS_ACUITY_SCORE: 14

## 2018-10-05 ASSESSMENT — PAIN DESCRIPTION - DESCRIPTORS
DESCRIPTORS: SORE
DESCRIPTORS: SHARP

## 2018-10-05 NOTE — PLAN OF CARE
Problem: Patient Care Overview  Goal: Plan of Care/Patient Progress Review  Outcome: Improving  A&Ox4. T: 98F and 99.8F, encouraged use of IS and given scheduled Tylenol. VSS otherwise. RA. Baseline numbness/tingling in BLE, moderate edema to LLE. CMS intact otherwise. Dressing intact, small amt. of dried drainage. Up w/ Ax1, gait belt, walker and KI. Ambulated in hallway, tolerated well. Glucose checks. Pt. has insulin pump, self-managed. BS active x4, tolerating MOD/CHO diet, passing flatus. Voiding in adequate amts. Pain managed w/ scheduled po Tylenol and prn po Oxycodone, Atarax. Lidocaine patch in place. Topical Bengay applied. DC to home possibly tomorrow. Will continue to monitor.

## 2018-10-05 NOTE — PLAN OF CARE
Problem: Knee Arthroplasty (Total, Partial) (Adult)  Goal: Signs and Symptoms of Listed Potential Problems Will be Absent, Minimized or Managed (Knee Arthroplasty)  Signs and symptoms of listed potential problems will be absent, minimized or managed by discharge/transition of care (reference Knee Arthroplasty (Total, Partial) (Adult) CPG).   Pt admitted post op L TKA. Dressing with small amount of drainage. 2+ edema noted in leg. Ice applied to affected area. CMS intact - baseline neuropathy noted in feet. Ambulating to the bathroom with x1 assist with  walker and gaitbelt, immobilizer, weight bearing as tolerated. Pain managed with tylenol, PRN oxycodone and atarax. Melatonin given x1 for sleep.  - insulin pump infusing per home settings. SW, Pain, and PT/OT following. Possible discharge today.

## 2018-10-05 NOTE — PROGRESS NOTES
[facility]    Orthopedics Progress Note     Assessment & Plan   Tika Martinez is a 63 year old female who was admitted on 10/2/2018 for left TKA. Plan is to discharge home today. Follow-up in 2 weeks.       Disposition Plan   Expected discharge: Today, recommended to prior living arrangement.     Entered: Jeffery Estrella 10/05/2018, 9:16 AM   Information in the above section will display in the discharge planner report.    PAMELA Jordan    Interval History   No major events overnight. Patient is progressing with therapy.     Physical Exam   Temp: 98.7  F (37.1  C) Temp src: Oral BP: 132/59 Pulse: 75 Heart Rate: 92 Resp: 16 SpO2: 92 % O2 Device: None (Room air)    Vitals:    09/17/18 1503 10/02/18 0554   Weight: 71.2 kg (157 lb) 70.8 kg (156 lb)     Vital Signs with Ranges  Temp:  [97.7  F (36.5  C)-99.8  F (37.7  C)] 98.7  F (37.1  C)  Pulse:  [75] 75  Heart Rate:  [92-98] 92  Resp:  [12-18] 16  BP: (132-150)/(58-72) 132/59  SpO2:  [92 %-98 %] 92 %  I/O last 3 completed shifts:  In: 1340 [P.O.:1340]  Out: -     Dressing shows mild blood. Ecchymosis as expected. NV intact distally.     Medications     insulin basal rate for inpatient ambulatory pump       lactated ringers 125 mL/hr at 10/02/18 2139       acetaminophen  975 mg Oral Q8H     allopurinol (ZYLOPRIM) tablet 200 mg  200 mg Oral At Bedtime     ascorbic acid  500 mg Oral Daily     atorvastatin (LIPITOR) tablet 40 mg  40 mg Oral At Bedtime     budesonide  9 mg Oral QAM     buPROPion  300 mg Oral QAM     cholecalciferol  5,000 Units Oral Daily     fluticasone furoate  1 puff Inhalation Daily     insulin bolus from AMBULATORY PUMP   Subcutaneous 4x Daily AC & HS     insulin bolus from AMBULATORY PUMP   Subcutaneous TID AC     lidocaine  1 patch Transdermal Q24h    And     lidocaine   Transdermal Q24H    And     lidocaine   Transdermal Q8H     loratadine  10 mg Oral Daily     menthol (Topical Analgesic) 2.5%   Topical TID      midodrine  5 mg Oral TID     mirtazapine (REMERON) tablet 45 mg  45 mg Oral At Bedtime     montelukast (SINGULAIR) tablet 10 mg  10 mg Oral At Bedtime     multivitamin, therapeutic with minerals  1 tablet Oral Daily     pantoprazole (PROTONIX) EC tablet 20 mg  20 mg Oral QAM AC     rivaroxaban ANTICOAGULANT  10 mg Oral Daily     rOPINIRole (REQUIP) tablet 1.5 mg  1.5 mg Oral Daily with lunch     senna-docusate  1 tablet Oral BID    Or     senna-docusate  2 tablet Oral BID     sodium chloride (PF)  3 mL Intracatheter Q8H     topiramate (TOPAMAX) tablet 200 mg  200 mg Oral BID       Data     Recent Labs  Lab 10/04/18  0701 10/03/18  0737   HGB 9.1* 9.3*   NA  --  138   POTASSIUM  --  3.9   CHLORIDE  --  108   CO2  --  24   BUN  --  20   CR  --  1.32*   ANIONGAP  --  6   KEMAL  --  7.9*   GLC  --  165*

## 2018-10-05 NOTE — PLAN OF CARE
Problem: Patient Care Overview  Goal: Plan of Care/Patient Progress Review    Discharge Planner OT   Patient plan for discharge: return home with  assist  Current status: Pt able to complete functional mobility ~100' with 4WW, SBA; pt demonstrated safety with 4WW, discussed how to use in order to improve safety and independence with transporting items; educated on DME for tub shower including tub bench, shower chair, and long handled sponge; following instructions for safety pt completed transfer in and out of tub, bench, 4WW, SBA; educated pt on management of KI with bathing tasks  Barriers to return to prior living situation: pain, fatigue  Recommendations for discharge: home with  assist for transportation and household IADLs; recommend obtaining tub bench and long handled sponge  Rationale for recommendations: able to demonstrate safety and independence with ADLs, no further OT needs     Entered by: Mary Hollins 10/05/2018 8:55 AM       Occupational Therapy Discharge Summary    Reason for therapy discharge:    Discharged to home.    Progress towards therapy goal(s). See goals on Care Plan in [facility] electronic health record for goal details.  Goals met    Therapy recommendation(s):    No further therapy is recommended.

## 2018-10-05 NOTE — PLAN OF CARE
Problem: Patient Care Overview  Goal: Plan of Care/Patient Progress Review  Discharge Planner PT   Patient plan for discharge: home today  Current status: Mod I gait with 4ww and KI ( unable to SLR MD aware) Mod I bed mobility and basic tranfers. ROM to 60. Mod I stair climbing as needed at home  Barriers to return to prior living situation: none  Recommendations for discharge: Home with OP PT per plan established by the PT.  Rationale for recommendations:goals are all met recommend to PT for discharge to home with OP PT per MD plan no DME needs has own 4ww.       Entered by: Renee Tarango 10/05/2018 9:15 AM

## 2018-10-05 NOTE — PROGRESS NOTES
[facility]  Hospitalist Progress Note  Name: Tika Martinez    MRN: [id number]  Provider:  Tessie Dodson MD  10/05/18    Initial presenting complaint/issue to hospital (Diagnosis): L TKA.         Assessment and Plan:      Summary of Stay: Tika Martinez is a 63 year old female admitted on 10/2/2018 with     left total knee arthroplasty.  Problem List:   1. Left total knee arthroplasty.  Pain medications as needed.  Ask pain team to assist with pain management.  Use incentive spirometry.  Work with therapy.  2. Diabetes mellitus.  Continue insulin pump.  3. Orthostatic Hypotension.  Continue midodrine.    4. Asthma.  No acute exacerbation.  Continue montelukast, fluticasone, and albuterol as needed.  5. GERD.  Continue pantoprazole.  6. Depression.  Continue bupropion.  7. Hyperlipidemia.  Continue atorvastatin.  8. Chronic kidney disease.  Creatinine at baseline.  Avoid nephrotoxins as able.  DVT Prophylaxis: Rivaroxaban.  Code Status: Full Code  Discharge Dispo: Per orthopedic surgery.  Estimated Disch Date / # of Days until Disch: Per orthopedic surgery.          Interval History:        No fevers/chills/chest pain/SOB.                  Physical Exam:      Last Vital Signs:  Temp: 98.7  F (37.1  C) Temp src: Oral BP: 132/59 Pulse: 75 Heart Rate: 92 Resp: 16 SpO2: 92 % O2 Device: None (Room air)      Intake/Output Summary (Last 24 hours) at 10/05/18 1205  Last data filed at 10/05/18 0600   Gross per 24 hour   Intake              860 ml   Output                0 ml   Net              860 ml     I/O last 3 completed shifts:  In: 1340 [P.O.:1340]  Out: -   Vitals:    09/17/18 1503 10/02/18 0554   Weight: 71.2 kg (157 lb) 70.8 kg (156 lb)       Gen - AAO x 3 inNAD.  Lungs - CTA B.  Heart - RR,S1+S2 nml, no m/g/r.  Abd - soft, NT, ND, + BS         Medications:      All current medications were reviewed.         Data:      All new lab and imaging data was reviewed.   Labs:    Recent Labs  Lab  10/04/18  0701 10/03/18  0737   HGB 9.1* 9.3*       Recent Labs  Lab 10/03/18  0737      POTASSIUM 3.9   CHLORIDE 108   CO2 24   ANIONGAP 6   *   BUN 20   CR 1.32*   GFRESTIMATED 41*   GFRESTBLACK 49*   KEMAL 7.9*      Recent Imaging:   No results found for this or any previous visit (from the past 24 hour(s)).

## 2018-10-07 NOTE — DISCHARGE SUMMARY
Admit Date:     10/02/2018   Discharge Date:     10/05/2018      ADMISSION DIAGNOSIS:  Osteoarthrosis of the left knee.      DISCHARGE DIAGNOSIS:  Status post left total knee arthroplasty.      PROCEDURE:  Left total knee arthroplasty performed by Dr. Julio Cesar Miranda at [facility] on 10/2/18.      INDICATIONS FOR ADMISSION:  The patient was admitted for postoperative observation care and pain control following her left total knee arthroplasty.      HOSPITAL COURSE:  On 10/2/18, the patient underwent a planned left total knee arthroplasty performed by Dr. Julio Cesar Miranda.  She tolerated the procedure well and was transferred up to the Medical/Surgical floor in stable condition.  During her stay, her vital signs remained stable.  Her hemoglobin dropped to a low of 9.1 postoperatively, and she required no blood transfusions.  Physical and Occupational Therapy were consulted, and at her time of discharge, she was ambulating well with a front-wheeled walker.  DVT and PE prophylaxis included early ambulation, lower extremity compression devices, lower extremity pneumatic devices, and oral Xarelto.  Incentive spirometry was utilized for pneumonia prophylaxis.  On 10/5/18, the patient was in stable condition with pain well controlled on oral pain medication, and she was discharged home.      DISCHARGE INSTRUCTIONS:  On 10/5/18, the patient was discharged home with the following instructions:  Weightbearing as tolerates, ice to the knee p.r.n. pain and swelling, keep incision clean and dry, and continue with outpatient physical therapy per total knee arthroplasty protocol.      DISCHARGE MEDICATIONS:  Orthopedic medications at time of discharge included Xarelto 10 mg by mouth daily for 14 days, acetaminophen 650 mg by mouth every 4 hours as needed for pain, and oxycodone 10-15 mg by mouth every 3 hours as needed for pain.      FOLLOWUP:  The patient will follow up at [facility] in 2 weeks.         JULIO CESAR  LILLIANA KEE MD       As dictated by CARIN CLARK PA-C            D: 10/06/2018   T: 10/06/2018   MT: [place]      Name:     JUSTIN STYLES   MRN:      [id number]        Account:        [id number]   :      1955           Admit Date:     10/02/2018                                  Discharge Date: 10/05/2018      Document: [id number]

## 2022-08-16 RX ORDER — CHOLESTYRAMINE LIGHT 4 G/5.7G
4 POWDER, FOR SUSPENSION ORAL 2 TIMES DAILY WITH MEALS
COMMUNITY

## 2022-08-16 RX ORDER — LISINOPRIL 2.5 MG/1
2.5 TABLET ORAL DAILY
COMMUNITY

## 2022-08-16 RX ORDER — INSULIN PUMP CONTROLLER
EACH MISCELLANEOUS
COMMUNITY
Start: 2022-06-06

## 2022-08-16 RX ORDER — GABAPENTIN 300 MG/1
300 CAPSULE ORAL 3 TIMES DAILY
COMMUNITY

## 2022-08-16 RX ORDER — OXYBUTYNIN CHLORIDE 5 MG/1
5 TABLET ORAL 3 TIMES DAILY
COMMUNITY

## 2022-08-16 RX ORDER — BUPRENORPHINE 2 MG/1
0.5 TABLET SUBLINGUAL 2 TIMES DAILY
COMMUNITY

## 2022-08-16 RX ORDER — GLUCAGON 3 MG/1
POWDER NASAL DAILY PRN
COMMUNITY

## 2022-08-16 NOTE — PROVIDER NOTIFICATION
08/16/22 1401   Discharge Planning   Patient/Family Anticipates Transition to home with family   Concerns to be Addressed all concerns addressed in this encounter   Living Arrangements   People in Home spouse   Type of Residence Private Residence   Number of Stairs, Within Home, Primary none   Stair Railings, Within Home, Primary none   Once home, are you able to live on one level? Yes   Bathroom Shower/Tub Tub/Shower unit   Education   Patient attended total joint pre-op class/received pre-op teaching  online

## 2022-08-26 ENCOUNTER — LAB (OUTPATIENT)
Dept: LAB | Facility: CLINIC | Age: 67
End: 2022-08-26
Payer: MEDICARE

## 2022-08-26 DIAGNOSIS — Z01.812 PRE-OPERATIVE LABORATORY EXAMINATION: ICD-10-CM

## 2022-08-26 PROCEDURE — U0005 INFEC AGEN DETEC AMPLI PROBE: HCPCS

## 2022-08-26 PROCEDURE — U0003 INFECTIOUS AGENT DETECTION BY NUCLEIC ACID (DNA OR RNA); SEVERE ACUTE RESPIRATORY SYNDROME CORONAVIRUS 2 (SARS-COV-2) (CORONAVIRUS DISEASE [COVID-19]), AMPLIFIED PROBE TECHNIQUE, MAKING USE OF HIGH THROUGHPUT TECHNOLOGIES AS DESCRIBED BY CMS-2020-01-R: HCPCS

## 2022-08-27 LAB — SARS-COV-2 RNA RESP QL NAA+PROBE: NEGATIVE

## 2022-08-28 NOTE — PHARMACY-ADMISSION MEDICATION HISTORY
Admission medication history interview status for this patient is complete. See [facility] admission navigator for allergy information, prior to admission medications and immunization status.      PTA meds completed by pre-admitting nurse Nelsy Anne and reviewed by pharmacy       Prior to Admission medications    Medication Sig Last Dose Taking? Auth Provider Long Term End Date   acetaminophen (TYLENOL) 325 MG tablet Take 2 tablets (650 mg) by mouth every 4 hours as needed for other (multimodal surgical pain management along with NSAIDS and opioid medication as indicated based on pain control and physical function.)  Yes Jeffery Estrella PA     albuterol (PROAIR HFA/PROVENTIL HFA/VENTOLIN HFA) 108 (90 Base) MCG/ACT inhaler Inhale 2 puffs into the lungs every 4 hours as needed for shortness of breath / dyspnea or wheezing  Yes Unknown, Entered By History Yes    ALLOPURINOL PO Take 200 mg by mouth At Bedtime   Yes Reported, Patient     ascorbic acid (VITAMIN C) 500 MG tablet Take 500 mg by mouth daily  Yes Unknown, Entered By History     ATORVASTATIN CALCIUM PO Take 40 mg by mouth At Bedtime  Yes Reported, Patient Yes    beclomethasone HFA (QVAR REDIHALER) 80 MCG/ACT inhaler Inhale 1 puff into the lungs 2 times daily  Yes Reported, Patient     buprenorphine (SUBUTEX) 2 MG SUBL sublingual tablet Place 0.5 mg under the tongue 2 times daily  Yes Reported, Patient     buPROPion (WELLBUTRIN XL) 300 MG 24 hr tablet Take 450 mg by mouth every evening  Yes Unknown, Entered By History Yes    calcium gluconate 500 MG tablet Take 500 mg by mouth daily  Yes Reported, Patient     Cholecalciferol (VITAMIN D-3) 5000 units TABS Take 2 tablets by mouth daily  Yes Unknown, Entered By History     cholestyramine light (PREVALITE) 4 GM powder Take 4 g by mouth 2 times daily (with meals)  Yes Reported, Patient Yes    Cranberry 1000 MG CAPS Take 1 capsule by mouth daily  Yes Reported, Patient     cyanocobalamin (VITAMIN B12) 1000  MCG/ML injection Inject 1 mL into the muscle every 30 days  Yes Reported, Patient     EPINEPHrine (EPIPEN JR) 0.15 MG/0.3ML injection 2-pack Inject 0.15 mg into the muscle as needed for anaphylaxis  Yes Reported, Patient     Ferrous Sulfate 324 (65 Fe) MG TBEC Take 324 mg by mouth daily  Yes Unknown, Entered By History     fluorouracil (EFUDEX) 5 % cream Apply topically 2 times daily  Yes Unknown, Entered By History     gabapentin (NEURONTIN) 300 MG capsule Take 300 mg by mouth 3 times daily  Yes Reported, Patient Yes    Glucagon (BAQSIMI ONE PACK) 3 MG/DOSE POWD Spray in nostril daily as needed (For dangerously low Blood sugar.)  Yes Reported, Patient Yes    hydrocortisone (ANUSOL-HC) 2.5 % cream Place rectally 2 times daily as needed for hemorrhoids  Yes Unknown, Entered By History     insulin degludec (TRESIBA) 100 UNIT/ML pen Inject 5 Units Subcutaneous daily as needed for other For use every 24 Hours for Pump Failure  Yes Reported, Patient     INSULIN PUMP - OUTPATIENT Date last updated:  10/02/2018  Insulin type: Novolog, Reservoir: 2mL  Omnipod [id number]  BASAL RATES and times:  0000: 0.2 units/hour  0600: 0.25 units/hour  1200: 0.2 units/hour  1800: 0.25 units/hour    CARB RATIO: 1 unit:18g carbs  Corection Factor (Sensitivity) and times:  1 unit per 70 mg/dL  BLOOD GLUCOSE TARGET: 120-140 mg/dL  Active Insulin Time:  4 hours    Sensor:  No  Yes Unknown, Entered By History     lisinopril (ZESTRIL) 2.5 MG tablet Take 2.5 mg by mouth daily  Yes Reported, Patient Yes    Loratadine (CLARITIN PO) Take 10 mg by mouth daily   Yes Reported, Patient     Losartan Potassium (COZAAR PO) Take 25 mg by mouth daily  Yes Reported, Patient Yes    Lysine 1000 MG TABS Take 1 tablet by mouth 2 times daily  Yes Reported, Patient     midodrine (PROAMATINE) 5 MG tablet Take 5 mg by mouth 3 times daily  Yes Unknown, Entered By History     MIRTAZAPINE PO Take 45 mg by mouth At Bedtime   Yes Reported, Patient Yes    MONTELUKAST SODIUM  PO Take 10 mg by mouth At Bedtime   Yes Reported, Patient Yes    Multiple Vitamins-Minerals (CENTRUM SILVER PO) Take 1 tablet by mouth daily   Yes Reported, Patient     multivitamin (OCUVITE) TABS tablet Take 1 tablet by mouth daily  Yes Reported, Patient     naloxone (NARCAN) 4 MG/0.1ML nasal spray Spray 4 mg into one nostril alternating nostrils once as needed for opioid reversal every 2-3 minutes until assistance arrives  Yes Reported, Patient     ONDANSETRON PO Take 4 mg by mouth every 6 hours as needed   Yes Reported, Patient     oxybutynin (DITROPAN) 5 MG tablet Take 5 mg by mouth 3 times daily  Yes Reported, Patient     Pantoprazole Sodium (PROTONIX PO) Take 20 mg by mouth every morning (before breakfast)  Yes Reported, Patient     ROPINIROLE HCL PO Take 1.5 mg by mouth At Bedtime  Yes Reported, Patient Yes    TIZANIDINE HCL PO Take 2 mg by mouth daily as needed   Yes Reported, Patient     TRIAZOLAM PO Take 0.5 mg by mouth nightly as needed  Yes Reported, Patient     Insulin Disposable Pump (OMNIPOD DASH PODS, GEN 4,) MISC    Reported, Patient

## 2022-08-30 ENCOUNTER — HOSPITAL ENCOUNTER (OUTPATIENT)
Facility: CLINIC | Age: 67
Discharge: HOME | End: 2022-08-31
Attending: ORTHOPAEDIC SURGERY | Admitting: ORTHOPAEDIC SURGERY
Payer: MEDICARE

## 2022-08-30 ENCOUNTER — ANESTHESIA (OUTPATIENT)
Dept: SURGERY | Facility: CLINIC | Age: 67
End: 2022-08-30
Payer: MEDICARE

## 2022-08-30 ENCOUNTER — APPOINTMENT (OUTPATIENT)
Dept: PHYSICAL THERAPY | Facility: CLINIC | Age: 67
End: 2022-08-30
Attending: ORTHOPAEDIC SURGERY
Payer: MEDICARE

## 2022-08-30 ENCOUNTER — APPOINTMENT (OUTPATIENT)
Dept: GENERAL RADIOLOGY | Facility: CLINIC | Age: 67
End: 2022-08-30
Attending: ORTHOPAEDIC SURGERY
Payer: MEDICARE

## 2022-08-30 ENCOUNTER — ANESTHESIA EVENT (OUTPATIENT)
Dept: SURGERY | Facility: CLINIC | Age: 67
End: 2022-08-30
Payer: MEDICARE

## 2022-08-30 DIAGNOSIS — Z96.651 TOTAL KNEE REPLACEMENT STATUS, RIGHT: Primary | ICD-10-CM

## 2022-08-30 DIAGNOSIS — Z96.651 S/P TOTAL KNEE ARTHROPLASTY, RIGHT: ICD-10-CM

## 2022-08-30 LAB
GLUCOSE BLDC GLUCOMTR-MCNC: 142 MG/DL (ref 70–99)
GLUCOSE BLDC GLUCOMTR-MCNC: 171 MG/DL (ref 70–99)
GLUCOSE BLDC GLUCOMTR-MCNC: 173 MG/DL (ref 70–99)
GLUCOSE BLDC GLUCOMTR-MCNC: 174 MG/DL (ref 70–99)
HBA1C MFR BLD: 7.1 %

## 2022-08-30 PROCEDURE — 250N000013 HC RX MED GY IP 250 OP 250 PS 637: Performed by: HOSPITALIST

## 2022-08-30 PROCEDURE — 36415 COLL VENOUS BLD VENIPUNCTURE: CPT | Performed by: HOSPITALIST

## 2022-08-30 PROCEDURE — 360N000077 HC SURGERY LEVEL 4, PER MIN: Performed by: ORTHOPAEDIC SURGERY

## 2022-08-30 PROCEDURE — 250N000011 HC RX IP 250 OP 636: Performed by: PHYSICIAN ASSISTANT

## 2022-08-30 PROCEDURE — 271N000001 HC OR GENERAL SUPPLY NON-STERILE: Performed by: ORTHOPAEDIC SURGERY

## 2022-08-30 PROCEDURE — 250N000013 HC RX MED GY IP 250 OP 250 PS 637: Performed by: ORTHOPAEDIC SURGERY

## 2022-08-30 PROCEDURE — 83036 HEMOGLOBIN GLYCOSYLATED A1C: CPT | Performed by: HOSPITALIST

## 2022-08-30 PROCEDURE — 258N000003 HC RX IP 258 OP 636: Performed by: ANESTHESIOLOGY

## 2022-08-30 PROCEDURE — 999N000065 XR KNEE PORT RIGHT 1/2 VIEWS: Mod: RT

## 2022-08-30 PROCEDURE — 710N000009 HC RECOVERY PHASE 1, LEVEL 1, PER MIN: Performed by: ORTHOPAEDIC SURGERY

## 2022-08-30 PROCEDURE — 97161 PT EVAL LOW COMPLEX 20 MIN: CPT | Mod: GP | Performed by: PHYSICAL THERAPIST

## 2022-08-30 PROCEDURE — 97116 GAIT TRAINING THERAPY: CPT | Mod: GP | Performed by: PHYSICAL THERAPIST

## 2022-08-30 PROCEDURE — 250N000011 HC RX IP 250 OP 636

## 2022-08-30 PROCEDURE — 258N000001 HC RX 258: Performed by: ORTHOPAEDIC SURGERY

## 2022-08-30 PROCEDURE — C1713 ANCHOR/SCREW BN/BN,TIS/BN: HCPCS | Performed by: ORTHOPAEDIC SURGERY

## 2022-08-30 PROCEDURE — 272N000001 HC OR GENERAL SUPPLY STERILE: Performed by: ORTHOPAEDIC SURGERY

## 2022-08-30 PROCEDURE — 97530 THERAPEUTIC ACTIVITIES: CPT | Mod: GP | Performed by: PHYSICAL THERAPIST

## 2022-08-30 PROCEDURE — 250N000011 HC RX IP 250 OP 636: Performed by: ANESTHESIOLOGY

## 2022-08-30 PROCEDURE — 250N000011 HC RX IP 250 OP 636: Performed by: ORTHOPAEDIC SURGERY

## 2022-08-30 PROCEDURE — 999N000141 HC STATISTIC PRE-PROCEDURE NURSING ASSESSMENT: Performed by: ORTHOPAEDIC SURGERY

## 2022-08-30 PROCEDURE — 250N000009 HC RX 250

## 2022-08-30 PROCEDURE — 370N000017 HC ANESTHESIA TECHNICAL FEE, PER MIN: Performed by: ORTHOPAEDIC SURGERY

## 2022-08-30 PROCEDURE — 99203 OFFICE O/P NEW LOW 30 MIN: CPT | Performed by: HOSPITALIST

## 2022-08-30 PROCEDURE — 250N000013 HC RX MED GY IP 250 OP 250 PS 637: Performed by: PHYSICIAN ASSISTANT

## 2022-08-30 PROCEDURE — 82962 GLUCOSE BLOOD TEST: CPT

## 2022-08-30 PROCEDURE — C1776 JOINT DEVICE (IMPLANTABLE): HCPCS | Performed by: ORTHOPAEDIC SURGERY

## 2022-08-30 PROCEDURE — 250N000013 HC RX MED GY IP 250 OP 250 PS 637: Performed by: ANESTHESIOLOGY

## 2022-08-30 PROCEDURE — 258N000003 HC RX IP 258 OP 636: Performed by: ORTHOPAEDIC SURGERY

## 2022-08-30 DEVICE — BONE CEMENT SIMPLEX FULL DOSE 6191-1-001: Type: IMPLANTABLE DEVICE | Site: KNEE | Status: FUNCTIONAL

## 2022-08-30 DEVICE — IMP INSERT BASEPLATE TIBIAL HOWM TRI 3 5521-B-300: Type: IMPLANTABLE DEVICE | Site: KNEE | Status: FUNCTIONAL

## 2022-08-30 DEVICE — IMP COMP FEM STRK TRIATHLN PS RT 2 5515-F-202: Type: IMPLANTABLE DEVICE | Site: KNEE | Status: FUNCTIONAL

## 2022-08-30 DEVICE — BONE CEMENT SIMPLEX W/TOBRAMYCIN 6197-9-001: Type: IMPLANTABLE DEVICE | Site: KNEE | Status: FUNCTIONAL

## 2022-08-30 DEVICE — IMP COMP PATELLA TRI 29X8MM 5550-L-298: Type: IMPLANTABLE DEVICE | Site: KNEE | Status: FUNCTIONAL

## 2022-08-30 DEVICE — IMPLANTABLE DEVICE: Type: IMPLANTABLE DEVICE | Site: KNEE | Status: FUNCTIONAL

## 2022-08-30 RX ORDER — OXYBUTYNIN CHLORIDE 5 MG/1
5 TABLET ORAL 3 TIMES DAILY
Status: DISCONTINUED | OUTPATIENT
Start: 2022-08-30 | End: 2022-08-31 | Stop reason: HOSPADM

## 2022-08-30 RX ORDER — GLYCOPYRROLATE 0.2 MG/ML
INJECTION, SOLUTION INTRAMUSCULAR; INTRAVENOUS PRN
Status: DISCONTINUED | OUTPATIENT
Start: 2022-08-30 | End: 2022-08-30

## 2022-08-30 RX ORDER — DEXTROSE MONOHYDRATE 25 G/50ML
25-50 INJECTION, SOLUTION INTRAVENOUS
Status: DISCONTINUED | OUTPATIENT
Start: 2022-08-30 | End: 2022-08-30

## 2022-08-30 RX ORDER — CEFAZOLIN SODIUM/WATER 2 G/20 ML
2 SYRINGE (ML) INTRAVENOUS
Status: COMPLETED | OUTPATIENT
Start: 2022-08-30 | End: 2022-08-30

## 2022-08-30 RX ORDER — DEXTROSE MONOHYDRATE 25 G/50ML
25-50 INJECTION, SOLUTION INTRAVENOUS
Status: DISCONTINUED | OUTPATIENT
Start: 2022-08-30 | End: 2022-08-31 | Stop reason: HOSPADM

## 2022-08-30 RX ORDER — NALOXONE HYDROCHLORIDE 0.4 MG/ML
0.2 INJECTION, SOLUTION INTRAMUSCULAR; INTRAVENOUS; SUBCUTANEOUS
Status: DISCONTINUED | OUTPATIENT
Start: 2022-08-30 | End: 2022-08-31 | Stop reason: HOSPADM

## 2022-08-30 RX ORDER — ONDANSETRON 4 MG/1
4 TABLET, ORALLY DISINTEGRATING ORAL EVERY 6 HOURS PRN
Status: DISCONTINUED | OUTPATIENT
Start: 2022-08-30 | End: 2022-08-31 | Stop reason: HOSPADM

## 2022-08-30 RX ORDER — HYDROMORPHONE HCL IN WATER/PF 6 MG/30 ML
0.2 PATIENT CONTROLLED ANALGESIA SYRINGE INTRAVENOUS
Status: DISCONTINUED | OUTPATIENT
Start: 2022-08-30 | End: 2022-08-31 | Stop reason: HOSPADM

## 2022-08-30 RX ORDER — NALOXONE HYDROCHLORIDE 0.4 MG/ML
0.4 INJECTION, SOLUTION INTRAMUSCULAR; INTRAVENOUS; SUBCUTANEOUS
Status: DISCONTINUED | OUTPATIENT
Start: 2022-08-30 | End: 2022-08-31 | Stop reason: HOSPADM

## 2022-08-30 RX ORDER — NICOTINE POLACRILEX 4 MG
15-30 LOZENGE BUCCAL
Status: DISCONTINUED | OUTPATIENT
Start: 2022-08-30 | End: 2022-08-30

## 2022-08-30 RX ORDER — FENTANYL CITRATE 50 UG/ML
50 INJECTION, SOLUTION INTRAMUSCULAR; INTRAVENOUS EVERY 5 MIN PRN
Status: DISCONTINUED | OUTPATIENT
Start: 2022-08-30 | End: 2022-08-30 | Stop reason: HOSPADM

## 2022-08-30 RX ORDER — OXYCODONE HYDROCHLORIDE 5 MG/1
5 TABLET ORAL EVERY 4 HOURS PRN
Status: DISCONTINUED | OUTPATIENT
Start: 2022-08-30 | End: 2022-08-30 | Stop reason: HOSPADM

## 2022-08-30 RX ORDER — BISACODYL 10 MG
10 SUPPOSITORY, RECTAL RECTAL DAILY PRN
Status: DISCONTINUED | OUTPATIENT
Start: 2022-08-30 | End: 2022-08-31 | Stop reason: HOSPADM

## 2022-08-30 RX ORDER — CHOLESTYRAMINE 4 G/9G
4 POWDER, FOR SUSPENSION ORAL 2 TIMES DAILY WITH MEALS
Status: DISCONTINUED | OUTPATIENT
Start: 2022-08-30 | End: 2022-08-31 | Stop reason: HOSPADM

## 2022-08-30 RX ORDER — METOPROLOL TARTRATE 1 MG/ML
1-2 INJECTION, SOLUTION INTRAVENOUS EVERY 5 MIN PRN
Status: DISCONTINUED | OUTPATIENT
Start: 2022-08-30 | End: 2022-08-30 | Stop reason: HOSPADM

## 2022-08-30 RX ORDER — SODIUM CHLORIDE, SODIUM LACTATE, POTASSIUM CHLORIDE, CALCIUM CHLORIDE 600; 310; 30; 20 MG/100ML; MG/100ML; MG/100ML; MG/100ML
INJECTION, SOLUTION INTRAVENOUS CONTINUOUS
Status: DISCONTINUED | OUTPATIENT
Start: 2022-08-30 | End: 2022-08-30 | Stop reason: HOSPADM

## 2022-08-30 RX ORDER — LISINOPRIL 2.5 MG/1
2.5 TABLET ORAL DAILY
Status: DISCONTINUED | OUTPATIENT
Start: 2022-08-31 | End: 2022-08-31 | Stop reason: HOSPADM

## 2022-08-30 RX ORDER — VANCOMYCIN HYDROCHLORIDE 1 G/20ML
INJECTION, POWDER, LYOPHILIZED, FOR SOLUTION INTRAVENOUS PRN
Status: DISCONTINUED | OUTPATIENT
Start: 2022-08-30 | End: 2022-08-30 | Stop reason: HOSPADM

## 2022-08-30 RX ORDER — CHOLESTYRAMINE LIGHT 4 G/5.7G
4 POWDER, FOR SUSPENSION ORAL 2 TIMES DAILY WITH MEALS
Status: DISCONTINUED | OUTPATIENT
Start: 2022-08-30 | End: 2022-08-30

## 2022-08-30 RX ORDER — PROPOFOL 10 MG/ML
INJECTION, EMULSION INTRAVENOUS PRN
Status: DISCONTINUED | OUTPATIENT
Start: 2022-08-30 | End: 2022-08-30

## 2022-08-30 RX ORDER — HYDROMORPHONE HCL IN WATER/PF 6 MG/30 ML
0.4 PATIENT CONTROLLED ANALGESIA SYRINGE INTRAVENOUS EVERY 5 MIN PRN
Status: DISCONTINUED | OUTPATIENT
Start: 2022-08-30 | End: 2022-08-30 | Stop reason: HOSPADM

## 2022-08-30 RX ORDER — ALLOPURINOL 100 MG/1
200 TABLET ORAL AT BEDTIME
Status: DISCONTINUED | OUTPATIENT
Start: 2022-08-30 | End: 2022-08-31 | Stop reason: HOSPADM

## 2022-08-30 RX ORDER — BUPIVACAINE HYDROCHLORIDE 2.5 MG/ML
INJECTION, SOLUTION EPIDURAL; INFILTRATION; INTRACAUDAL PRN
Status: DISCONTINUED | OUTPATIENT
Start: 2022-08-30 | End: 2022-08-30 | Stop reason: HOSPADM

## 2022-08-30 RX ORDER — MEPERIDINE HYDROCHLORIDE 25 MG/ML
12.5 INJECTION INTRAMUSCULAR; INTRAVENOUS; SUBCUTANEOUS
Status: DISCONTINUED | OUTPATIENT
Start: 2022-08-30 | End: 2022-08-30 | Stop reason: HOSPADM

## 2022-08-30 RX ORDER — FENTANYL CITRATE 50 UG/ML
INJECTION, SOLUTION INTRAMUSCULAR; INTRAVENOUS PRN
Status: DISCONTINUED | OUTPATIENT
Start: 2022-08-30 | End: 2022-08-30

## 2022-08-30 RX ORDER — NEOSTIGMINE METHYLSULFATE 1 MG/ML
VIAL (ML) INJECTION PRN
Status: DISCONTINUED | OUTPATIENT
Start: 2022-08-30 | End: 2022-08-30

## 2022-08-30 RX ORDER — POLYETHYLENE GLYCOL 3350 17 G/17G
17 POWDER, FOR SOLUTION ORAL DAILY
Status: DISCONTINUED | OUTPATIENT
Start: 2022-08-31 | End: 2022-08-31 | Stop reason: HOSPADM

## 2022-08-30 RX ORDER — HYDROMORPHONE HCL IN WATER/PF 6 MG/30 ML
0.4 PATIENT CONTROLLED ANALGESIA SYRINGE INTRAVENOUS
Status: DISCONTINUED | OUTPATIENT
Start: 2022-08-30 | End: 2022-08-31 | Stop reason: HOSPADM

## 2022-08-30 RX ORDER — ONDANSETRON 4 MG/1
4-8 TABLET, ORALLY DISINTEGRATING ORAL EVERY 8 HOURS PRN
Qty: 10 TABLET | Refills: 0 | Status: SHIPPED | OUTPATIENT
Start: 2022-08-30

## 2022-08-30 RX ORDER — MONTELUKAST SODIUM 10 MG/1
10 TABLET ORAL AT BEDTIME
Status: DISCONTINUED | OUTPATIENT
Start: 2022-08-30 | End: 2022-08-31 | Stop reason: HOSPADM

## 2022-08-30 RX ORDER — AMOXICILLIN 250 MG
1 CAPSULE ORAL 2 TIMES DAILY
Status: DISCONTINUED | OUTPATIENT
Start: 2022-08-30 | End: 2022-08-31 | Stop reason: HOSPADM

## 2022-08-30 RX ORDER — ONDANSETRON 2 MG/ML
4 INJECTION INTRAMUSCULAR; INTRAVENOUS EVERY 30 MIN PRN
Status: DISCONTINUED | OUTPATIENT
Start: 2022-08-30 | End: 2022-08-30 | Stop reason: HOSPADM

## 2022-08-30 RX ORDER — LABETALOL 20 MG/4 ML (5 MG/ML) INTRAVENOUS SYRINGE
PRN
Status: DISCONTINUED | OUTPATIENT
Start: 2022-08-30 | End: 2022-08-30

## 2022-08-30 RX ORDER — TIZANIDINE 2 MG/1
2 TABLET ORAL DAILY PRN
Status: DISCONTINUED | OUTPATIENT
Start: 2022-08-30 | End: 2022-08-31 | Stop reason: HOSPADM

## 2022-08-30 RX ORDER — PHENYLEPHRINE HYDROCHLORIDE 10 MG/ML
INJECTION INTRAVENOUS PRN
Status: DISCONTINUED | OUTPATIENT
Start: 2022-08-30 | End: 2022-08-30

## 2022-08-30 RX ORDER — HYDROXYZINE HYDROCHLORIDE 10 MG/1
10 TABLET, FILM COATED ORAL EVERY 6 HOURS PRN
Status: DISCONTINUED | OUTPATIENT
Start: 2022-08-30 | End: 2022-08-31 | Stop reason: HOSPADM

## 2022-08-30 RX ORDER — MIRTAZAPINE 15 MG/1
45 TABLET, FILM COATED ORAL AT BEDTIME
Status: DISCONTINUED | OUTPATIENT
Start: 2022-08-30 | End: 2022-08-31 | Stop reason: HOSPADM

## 2022-08-30 RX ORDER — OXYCODONE HYDROCHLORIDE 5 MG/1
5 TABLET ORAL EVERY 4 HOURS PRN
Status: DISCONTINUED | OUTPATIENT
Start: 2022-08-30 | End: 2022-08-31 | Stop reason: ALTCHOICE

## 2022-08-30 RX ORDER — LIDOCAINE HYDROCHLORIDE 10 MG/ML
INJECTION, SOLUTION INFILTRATION; PERINEURAL PRN
Status: DISCONTINUED | OUTPATIENT
Start: 2022-08-30 | End: 2022-08-30

## 2022-08-30 RX ORDER — OXYCODONE HYDROCHLORIDE 5 MG/1
5-10 TABLET ORAL EVERY 4 HOURS PRN
Qty: 33 TABLET | Refills: 0 | Status: SHIPPED | OUTPATIENT
Start: 2022-08-30 | End: 2022-08-31

## 2022-08-30 RX ORDER — CEFAZOLIN SODIUM 2 G/100ML
2 INJECTION, SOLUTION INTRAVENOUS EVERY 8 HOURS
Status: COMPLETED | OUTPATIENT
Start: 2022-08-30 | End: 2022-08-31

## 2022-08-30 RX ORDER — BUPROPION HYDROCHLORIDE 150 MG/1
450 TABLET ORAL EVERY EVENING
Status: DISCONTINUED | OUTPATIENT
Start: 2022-08-30 | End: 2022-08-31 | Stop reason: HOSPADM

## 2022-08-30 RX ORDER — TRANEXAMIC ACID 650 MG/1
1950 TABLET ORAL ONCE
Status: COMPLETED | OUTPATIENT
Start: 2022-08-30 | End: 2022-08-30

## 2022-08-30 RX ORDER — HYDRALAZINE HYDROCHLORIDE 20 MG/ML
2.5-5 INJECTION INTRAMUSCULAR; INTRAVENOUS EVERY 10 MIN PRN
Status: DISCONTINUED | OUTPATIENT
Start: 2022-08-30 | End: 2022-08-30 | Stop reason: HOSPADM

## 2022-08-30 RX ORDER — LIDOCAINE 40 MG/G
CREAM TOPICAL
Status: DISCONTINUED | OUTPATIENT
Start: 2022-08-30 | End: 2022-08-30 | Stop reason: HOSPADM

## 2022-08-30 RX ORDER — ONDANSETRON 2 MG/ML
4 INJECTION INTRAMUSCULAR; INTRAVENOUS EVERY 6 HOURS PRN
Status: DISCONTINUED | OUTPATIENT
Start: 2022-08-30 | End: 2022-08-31 | Stop reason: HOSPADM

## 2022-08-30 RX ORDER — SODIUM CHLORIDE, SODIUM LACTATE, POTASSIUM CHLORIDE, CALCIUM CHLORIDE 600; 310; 30; 20 MG/100ML; MG/100ML; MG/100ML; MG/100ML
INJECTION, SOLUTION INTRAVENOUS CONTINUOUS
Status: DISCONTINUED | OUTPATIENT
Start: 2022-08-30 | End: 2022-08-31 | Stop reason: HOSPADM

## 2022-08-30 RX ORDER — OXYCODONE HYDROCHLORIDE 10 MG/1
10 TABLET ORAL EVERY 4 HOURS PRN
Status: DISCONTINUED | OUTPATIENT
Start: 2022-08-30 | End: 2022-08-31 | Stop reason: ALTCHOICE

## 2022-08-30 RX ORDER — ATORVASTATIN CALCIUM 40 MG/1
40 TABLET, FILM COATED ORAL AT BEDTIME
Status: DISCONTINUED | OUTPATIENT
Start: 2022-08-30 | End: 2022-08-31 | Stop reason: HOSPADM

## 2022-08-30 RX ORDER — HYDROXYZINE HYDROCHLORIDE 10 MG/1
10 TABLET, FILM COATED ORAL EVERY 6 HOURS PRN
Qty: 30 TABLET | Refills: 0 | Status: SHIPPED | OUTPATIENT
Start: 2022-08-30

## 2022-08-30 RX ORDER — AMOXICILLIN 250 MG
1-2 CAPSULE ORAL 2 TIMES DAILY
Qty: 30 TABLET | Refills: 0 | Status: SHIPPED | OUTPATIENT
Start: 2022-08-30

## 2022-08-30 RX ORDER — PANTOPRAZOLE SODIUM 20 MG/1
20 TABLET, DELAYED RELEASE ORAL
Status: DISCONTINUED | OUTPATIENT
Start: 2022-08-31 | End: 2022-08-31 | Stop reason: HOSPADM

## 2022-08-30 RX ORDER — LIDOCAINE 40 MG/G
CREAM TOPICAL
Status: DISCONTINUED | OUTPATIENT
Start: 2022-08-30 | End: 2022-08-31 | Stop reason: HOSPADM

## 2022-08-30 RX ORDER — PROPOFOL 10 MG/ML
INJECTION, EMULSION INTRAVENOUS CONTINUOUS PRN
Status: DISCONTINUED | OUTPATIENT
Start: 2022-08-30 | End: 2022-08-30

## 2022-08-30 RX ORDER — ONDANSETRON 4 MG/1
4 TABLET, ORALLY DISINTEGRATING ORAL EVERY 30 MIN PRN
Status: DISCONTINUED | OUTPATIENT
Start: 2022-08-30 | End: 2022-08-30 | Stop reason: HOSPADM

## 2022-08-30 RX ORDER — ONDANSETRON 2 MG/ML
INJECTION INTRAMUSCULAR; INTRAVENOUS PRN
Status: DISCONTINUED | OUTPATIENT
Start: 2022-08-30 | End: 2022-08-30

## 2022-08-30 RX ORDER — FENTANYL CITRATE 50 UG/ML
50 INJECTION, SOLUTION INTRAMUSCULAR; INTRAVENOUS
Status: DISCONTINUED | OUTPATIENT
Start: 2022-08-30 | End: 2022-08-30

## 2022-08-30 RX ORDER — GABAPENTIN 300 MG/1
300 CAPSULE ORAL 3 TIMES DAILY
Status: DISCONTINUED | OUTPATIENT
Start: 2022-08-30 | End: 2022-08-31 | Stop reason: HOSPADM

## 2022-08-30 RX ORDER — MIDODRINE HYDROCHLORIDE 5 MG/1
5 TABLET ORAL 3 TIMES DAILY
Status: DISCONTINUED | OUTPATIENT
Start: 2022-08-31 | End: 2022-08-31 | Stop reason: HOSPADM

## 2022-08-30 RX ORDER — CEFAZOLIN SODIUM/WATER 2 G/20 ML
2 SYRINGE (ML) INTRAVENOUS SEE ADMIN INSTRUCTIONS
Status: DISCONTINUED | OUTPATIENT
Start: 2022-08-30 | End: 2022-08-30 | Stop reason: HOSPADM

## 2022-08-30 RX ORDER — NICOTINE POLACRILEX 4 MG
15-30 LOZENGE BUCCAL
Status: DISCONTINUED | OUTPATIENT
Start: 2022-08-30 | End: 2022-08-31 | Stop reason: HOSPADM

## 2022-08-30 RX ADMIN — FENTANYL CITRATE 100 MCG: 50 INJECTION, SOLUTION INTRAMUSCULAR; INTRAVENOUS at 10:29

## 2022-08-30 RX ADMIN — ONDANSETRON HYDROCHLORIDE 4 MG: 2 INJECTION, SOLUTION INTRAVENOUS at 10:37

## 2022-08-30 RX ADMIN — LABETALOL 20 MG/4 ML (5 MG/ML) INTRAVENOUS SYRINGE 5 MG: at 10:38

## 2022-08-30 RX ADMIN — SODIUM CHLORIDE, POTASSIUM CHLORIDE, SODIUM LACTATE AND CALCIUM CHLORIDE: 600; 310; 30; 20 INJECTION, SOLUTION INTRAVENOUS at 09:24

## 2022-08-30 RX ADMIN — FENTANYL CITRATE 50 MCG: 50 INJECTION, SOLUTION INTRAMUSCULAR; INTRAVENOUS at 12:53

## 2022-08-30 RX ADMIN — PHENYLEPHRINE HYDROCHLORIDE 100 MCG: 10 INJECTION INTRAVENOUS at 11:20

## 2022-08-30 RX ADMIN — MIRTAZAPINE 45 MG: 15 TABLET, FILM COATED ORAL at 22:19

## 2022-08-30 RX ADMIN — PROPOFOL 50 MCG/KG/MIN: 10 INJECTION, EMULSION INTRAVENOUS at 10:10

## 2022-08-30 RX ADMIN — SODIUM CHLORIDE, POTASSIUM CHLORIDE, SODIUM LACTATE AND CALCIUM CHLORIDE: 600; 310; 30; 20 INJECTION, SOLUTION INTRAVENOUS at 11:47

## 2022-08-30 RX ADMIN — HYDROMORPHONE HYDROCHLORIDE 0.4 MG: 0.2 INJECTION, SOLUTION INTRAMUSCULAR; INTRAVENOUS; SUBCUTANEOUS at 13:36

## 2022-08-30 RX ADMIN — MIDAZOLAM 2 MG: 1 INJECTION INTRAMUSCULAR; INTRAVENOUS at 09:28

## 2022-08-30 RX ADMIN — MIDAZOLAM 1 MG: 1 INJECTION INTRAMUSCULAR; INTRAVENOUS at 10:14

## 2022-08-30 RX ADMIN — HYDROXYZINE HYDROCHLORIDE 10 MG: 10 TABLET ORAL at 12:39

## 2022-08-30 RX ADMIN — OXYBUTYNIN CHLORIDE 5 MG: 5 TABLET ORAL at 19:15

## 2022-08-30 RX ADMIN — HYDROMORPHONE HYDROCHLORIDE 0.2 MG: 0.2 INJECTION, SOLUTION INTRAMUSCULAR; INTRAVENOUS; SUBCUTANEOUS at 16:44

## 2022-08-30 RX ADMIN — FENTANYL CITRATE 50 MCG: 50 INJECTION, SOLUTION INTRAMUSCULAR; INTRAVENOUS at 10:42

## 2022-08-30 RX ADMIN — FENTANYL CITRATE 50 MCG: 50 INJECTION, SOLUTION INTRAMUSCULAR; INTRAVENOUS at 12:44

## 2022-08-30 RX ADMIN — MONTELUKAST 10 MG: 10 TABLET, FILM COATED ORAL at 22:20

## 2022-08-30 RX ADMIN — PHENYLEPHRINE HYDROCHLORIDE 100 MCG: 10 INJECTION INTRAVENOUS at 11:04

## 2022-08-30 RX ADMIN — BUPROPION HYDROCHLORIDE 450 MG: 150 TABLET, FILM COATED, EXTENDED RELEASE ORAL at 19:15

## 2022-08-30 RX ADMIN — FENTANYL CITRATE 100 MCG: 50 INJECTION, SOLUTION INTRAMUSCULAR; INTRAVENOUS at 09:28

## 2022-08-30 RX ADMIN — FENTANYL CITRATE 100 MCG: 50 INJECTION, SOLUTION INTRAMUSCULAR; INTRAVENOUS at 10:14

## 2022-08-30 RX ADMIN — CEFAZOLIN SODIUM 2 G: 2 INJECTION, SOLUTION INTRAVENOUS at 19:08

## 2022-08-30 RX ADMIN — FENTANYL CITRATE 50 MCG: 50 INJECTION, SOLUTION INTRAMUSCULAR; INTRAVENOUS at 12:33

## 2022-08-30 RX ADMIN — LABETALOL 20 MG/4 ML (5 MG/ML) INTRAVENOUS SYRINGE 5 MG: at 10:49

## 2022-08-30 RX ADMIN — FENTANYL CITRATE 50 MCG: 50 INJECTION, SOLUTION INTRAMUSCULAR; INTRAVENOUS at 12:26

## 2022-08-30 RX ADMIN — SODIUM CHLORIDE, POTASSIUM CHLORIDE, SODIUM LACTATE AND CALCIUM CHLORIDE: 600; 310; 30; 20 INJECTION, SOLUTION INTRAVENOUS at 09:23

## 2022-08-30 RX ADMIN — MIDAZOLAM 1 MG: 1 INJECTION INTRAMUSCULAR; INTRAVENOUS at 10:05

## 2022-08-30 RX ADMIN — OXYCODONE HYDROCHLORIDE 5 MG: 5 TABLET ORAL at 13:27

## 2022-08-30 RX ADMIN — OXYCODONE HYDROCHLORIDE 10 MG: 10 TABLET ORAL at 17:31

## 2022-08-30 RX ADMIN — HYDROMORPHONE HYDROCHLORIDE 0.4 MG: 0.2 INJECTION, SOLUTION INTRAMUSCULAR; INTRAVENOUS; SUBCUTANEOUS at 20:42

## 2022-08-30 RX ADMIN — GLYCOPYRROLATE 0.2 MG: 0.2 INJECTION, SOLUTION INTRAMUSCULAR; INTRAVENOUS at 11:45

## 2022-08-30 RX ADMIN — HYDROXYZINE HYDROCHLORIDE 10 MG: 10 TABLET ORAL at 19:04

## 2022-08-30 RX ADMIN — HYDROMORPHONE HYDROCHLORIDE 0.4 MG: 0.2 INJECTION, SOLUTION INTRAMUSCULAR; INTRAVENOUS; SUBCUTANEOUS at 13:10

## 2022-08-30 RX ADMIN — ROCURONIUM BROMIDE 50 MG: 50 INJECTION, SOLUTION INTRAVENOUS at 10:14

## 2022-08-30 RX ADMIN — Medication 2 G: at 10:05

## 2022-08-30 RX ADMIN — LIDOCAINE HYDROCHLORIDE 50 MG: 10 INJECTION, SOLUTION INFILTRATION; PERINEURAL at 10:14

## 2022-08-30 RX ADMIN — ATORVASTATIN CALCIUM 40 MG: 40 TABLET, FILM COATED ORAL at 22:20

## 2022-08-30 RX ADMIN — ROPINIROLE HYDROCHLORIDE 1.5 MG: 1 TABLET, FILM COATED ORAL at 22:19

## 2022-08-30 RX ADMIN — SENNOSIDES AND DOCUSATE SODIUM 1 TABLET: 50; 8.6 TABLET ORAL at 19:05

## 2022-08-30 RX ADMIN — PROPOFOL 150 MG: 10 INJECTION, EMULSION INTRAVENOUS at 10:14

## 2022-08-30 RX ADMIN — TRANEXAMIC ACID 1950 MG: 650 TABLET ORAL at 08:39

## 2022-08-30 RX ADMIN — GABAPENTIN 300 MG: 300 CAPSULE ORAL at 19:04

## 2022-08-30 RX ADMIN — ALLOPURINOL 200 MG: 100 TABLET ORAL at 22:21

## 2022-08-30 RX ADMIN — NEOSTIGMINE METHYLSULFATE 3 MG: 1 INJECTION, SOLUTION INTRAVENOUS at 11:45

## 2022-08-30 RX ADMIN — SODIUM CHLORIDE, POTASSIUM CHLORIDE, SODIUM LACTATE AND CALCIUM CHLORIDE: 600; 310; 30; 20 INJECTION, SOLUTION INTRAVENOUS at 16:43

## 2022-08-30 RX ADMIN — OXYCODONE HYDROCHLORIDE 10 MG: 10 TABLET ORAL at 22:22

## 2022-08-30 RX ADMIN — CHOLESTYRAMINE 4 G: 4 POWDER, FOR SUSPENSION ORAL at 20:38

## 2022-08-30 ASSESSMENT — ACTIVITIES OF DAILY LIVING (ADL)
ADLS_ACUITY_SCORE: 22

## 2022-08-30 NOTE — ANESTHESIA PROCEDURE NOTES
Airway       Patient location during procedure: OR       Procedure Start/Stop Times: 8/30/2022 10:15 AM  Staff -        Anesthesiologist:  Rudy Whitley MD       CRNA: Nir Sears APRN CRNA       Performed By: CRNA  Consent for Airway        Urgency: elective  Indications and Patient Condition       Indications for airway management: daniel-procedural       Induction type:intravenous       Mask difficulty assessment: 1 - vent by mask    Final Airway Details       Final airway type: endotracheal airway       Successful airway: ETT - single  Endotracheal Airway Details        ETT size (mm): 7.0       Cuffed: yes       Successful intubation technique: direct laryngoscopy       DL Blade Type: MAC 3       Adjucts: stylet       Position: Right       Measured from: lips       Secured at (cm): 22       Bite block used: Oral Airway    Post intubation assessment        Placement verified by: capnometry, equal breath sounds and chest rise        Number of attempts at approach: 1       Number of other approaches attempted: 0       Secured with: plastic tape       Ease of procedure: easy       Dentition: Intact and Unchanged    Medication(s) Administered   Medication Administration Time: 8/30/2022 10:15 AM

## 2022-08-30 NOTE — BRIEF OP NOTE
[facility]    Brief Operative Note    Pre-operative diagnosis: DJD (degenerative joint disease) [M19.90]  Post-operative diagnosis Same as pre-operative diagnosis    Procedure: Procedure(s):  Right total knee arthroplasty  Surgeon: Surgeon(s) and Role:     * Kai Miranda MD - Primary     * Jeffery Estrella PA - Assisting  Anesthesia: General with Block   Estimated Blood Loss: Less than 50 ml    Drains: None  Specimens:   ID Type Source Tests Collected by Time Destination   A : Right Knee Bone and Tissue Tissue Knee, Right OR DOCUMENTATION ONLY Kai Miranda MD 8/30/2022 11:21 AM      Findings:   None.  Complications: None.  Implants:   Implant Name Type Inv. Item Serial No.  Lot No. LRB No. Used Action   BONE CEMENT SIMPLEX FULL DOSE 6191-1-001 - [id number] Cement, Bone BONE CEMENT SIMPLEX FULL DOSE 6191-1-001  [employer] [id number] Right 1 Implanted   BONE CEMENT SIMPLEX W/TOBRAMYCIN 6197-9-001 - [id number] Cement, Bone BONE CEMENT SIMPLEX W/TOBRAMYCIN 6197-9-001  [employer] [id number] Right 1 Implanted   IMP COMP FEM STRK TRIATHLN PS RT 2 5515-F-202 - [id number] Total Joint Component/Insert IMP COMP FEM STRK TRIATHLN PS RT 2 5515-F-202  [employer] [id number] Right 1 Implanted   IMP INSERT BASEPLATE TIBIAL HOWM TRI 3 5521-B-300 - [id number] Total Joint Component/Insert IMP INSERT BASEPLATE TIBIAL HOWM TRI 3 5521-B-300  [employer] [id number] Right 1 Implanted   IMP COMP PATELLA TRI 29X8MM 5550-L-298 - [id number] Total Joint Component/Insert IMP COMP PATELLA TRI 29X8MM 5550-L-298  [employer] [id number] Right 1 Implanted   INSERT TIBIAL TRIATHLON KNEE SZ 3 12MM 5480-W-216-E - [id number] Total Joint Component/Insert INSERT TIBIAL TRIATHLON KNEE SZ 3 12MM 6069-N-186-E  [employer] [facility] [id number] Right 1 Implanted
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
## 2022-08-30 NOTE — PROGRESS NOTES
08/30/22 1748   Quick Adds   Type of Visit Initial PT Evaluation   Living Environment   People in Home spouse   Current Living Arrangements apartment  ([address] [place])   Home Accessibility no concerns   Number of Stairs, Within Home, Primary none   Living Environment Comments no stairs; long hallways   Self-Care   Usual Activity Tolerance moderate   Current Activity Tolerance fair   Regular Exercise Yes   Activity/Exercise Type walking   Exercise Amount/Frequency 3-5 times/wk   Equipment Currently Used at Home cane, straight  (has 2 walkers; sometimes cane depending on the day)   Fall history within last six months no   Activity/Exercise/Self-Care Comment normally independent with mobility and cares   General Information   Onset of Illness/Injury or Date of Surgery 08/30/22   Referring Physician Kai Miranda MD   Patient/Family Therapy Goals Statement (PT) return home with assist from spouse   Pertinent History of Current Problem (include personal factors and/or comorbidities that impact the POC) Patient seen POD #0 s/p R TKA; prior L TKA; see medical record for further information   Existing Precautions/Restrictions fall;brace worn when out of bed   Weight-Bearing Status - RLE weight-bearing as tolerated   General Observations patient in bed; reports 9/10 pain   Cognition   Affect/Mental Status (Cognition) WFL   Pain Assessment   Patient Currently in Pain Yes, see Vital Sign flowsheet  (9/10)   Integumentary/Edema   Integumentary/Edema Comments R knee incision; covered and acewrapped   Posture    Posture Comments forward flexed at trunk   Range of Motion (ROM)   ROM Comment R knee limited by recent surgery and pain; others appear WFL   Strength (Manual Muscle Testing)   Strength Comments R knee limited by recent surgery and pain; mild functional weakness noted with mobility   Bed Mobility   Comment, (Bed Mobility) min A for R LE into and OOB with use of KI   Transfers   Comment, (Transfers) sit>stand with min  A and use of KI   Gait/Stairs (Locomotion)   Distance in Feet (Required for LE Total Joints) 25 feet (eval)   Comment, (Gait/Stairs) use of walker; able to ambulate 25 feet with min A   Balance   Balance Comments current need for walker and assist; no gross LOB   Sensory Examination   Sensory Perception Comments intact per patient report   Clinical Impression   Criteria for Skilled Therapeutic Intervention Yes, treatment indicated   PT Diagnosis (PT) impaired functional mobility   Influenced by the following impairments decreased R knee ROM/strength; pain; decreased activity tolerance   Functional limitations due to impairments impaired independence with mobility and cares secondary to above deficits   Clinical Presentation (PT Evaluation Complexity) Evolving/Changing   Clinical Presentation Rationale clinical judgement; level of assist   Clinical Decision Making (Complexity) low complexity   Planned Therapy Interventions (PT) bed mobility training;gait training;home exercise program;patient/family education;ROM (range of motion);strengthening   Anticipated Equipment Needs at Discharge (PT) walker, rolling  (has 2)   Risk & Benefits of therapy have been explained evaluation/treatment results reviewed;care plan/treatment goals reviewed;risks/benefits reviewed;current/potential barriers reviewed;participants voiced agreement with care plan;participants included;patient   PT Discharge Planning   PT Discharge Recommendation (DC Rec)   (defer to Ortho)   PT Rationale for DC Rec Anticipate once pain is controlled, patient will be safe to discharge to home with assist of spouse for mobility and cares and use of a walker; patient has no stairs; has needed equipment   PT Brief overview of current status Min/CGA for gait/transfers with walker; use of KI   Plan of Care Review   Plan of Care Reviewed With patient   Total Evaluation Time   Total Evaluation Time (Minutes) 10   Physical Therapy Goals   PT Frequency 2x/day   PT  Predicted Duration/Target Date for Goal Attainment 08/31/22   PT Goals Bed Mobility;Transfers;Gait   PT: Bed Mobility Independent;Supine to/from sit   PT: Transfers Supervision/stand-by assist;Sit to/from stand;Bed to/from chair;Assistive device   PT: Gait Supervision/stand-by assist;Rolling walker;100 feet

## 2022-08-30 NOTE — PROGRESS NOTES
SPIRITUAL HEALTH SERVICES  [facility]  PRE-SURGERY VISIT    Pre-surgical visit with pt and spouse, Bernabe.  Pt is Muslim.  Provided spiritual support, prayer.   Oriented to Spiritual Health Services and availability for emotional/spiritual support during hospital stay.         Miguel Herrera MA  Staff   Pager: [phone]  Phone: *[phone]

## 2022-08-30 NOTE — OP NOTE
Procedure Date: 08/30/2022    PREOPERATIVE DIAGNOSES:  Right knee primary osteoarthritis.    POSTOPERATIVE DIAGNOSIS:  Right knee primary osteoarthritis.    PROCEDURE:  Right total knee arthroplasty.    SURGEON:  Kai Miranda MD    ASSISTANT:  Miguel Estrella PA-C    ANESTHESIA:  General, regional and local.    ESTIMATED BLOOD LOSS:  Less than 50 mL    DRAINS:  None.    COMPLICATIONS:  None apparent.    INDICATIONS FOR PROCEDURE:  Tika is a 67-year-old female with longstanding right knee pain with grade 4 changes on x-ray.  She had previous left total knee arthroplasty with excellent results.  Due to failed conservative management and poor quality of life, she elected to proceed with a right total knee arthroplasty.  Risks, benefits and alternatives were reviewed with her in the clinic prior to the procedure and the consent was signed today.    DESCRIPTION OF PROCEDURE:  Tika was brought to the operating room and placed supine on the operating table.  General endotracheal anesthesia was administered after a block was established in the PACU.  After a timeout confirmed the appropriate limb, limb was exsanguinated, standard midline incision directly over the knee was undertaken with sharp dissection down to the patella.  Medial parapatellar arthrotomy was performed.  The patella was easily everted.  Some of the fat pad and meniscus tissue was excised as well as some bursal tissue and an initial medial release was performed.  We were then able to flex the knee up, everting the patella. We opened up the canal, used our flex ana system.  She did have a 10-degree flexion contracture, so we took 10 off distally and we were pleased with this cut.  We then sized her to a 2; however, there was some concern over some notching, so we did use our step block.  We then used our 4-in-1 cutting jig, made our anterior, posterior and chamfer cuts and were pleased that there was no significant notching.  With that completed, we  then made a nice flat tibial cut, taking 2 off medially, which took 9 off laterally, again consistent with her varus deformity.  We then used our soft tissue tensioning guide and noted it to be somewhere around a 10 or 11 poly.  We then cut the box for posterior stabilized component, releasing the rest of the PCL, removing meniscus tissue and posterior osteophytes.  A formal trial was performed and we actually balanced after a more aggressive medial release with a 12 poly.  We then cut the patella to accept a 29 symmetric patella, drilling our 3 holes.  Trialing, we had excellent tracking and no lateral release was needed.      That completed, the trial components were removed.  Tibia was reamed and punched for our Universal tibial base plate. We copiously irrigated, injected the posterior capsule.  We then cemented our components without difficulty, waiting roughly 15 minutes for the cement to dry.  We did use IrriSept irrigation for 3 minutes during the cement drying process.  THIS WAS IRRIGATED OUT COMPLETELY AS SHE HAD AN IODINE ALLERGY.  With that completed, we removed excess cement from around the components.  We then impacted the size 12 poly and a 3 universal tibial baseplate after appropriate preparation.      Final copious irrigation was performed.  One gram powdered vancomycin was placed.  Standard layered closure.  Sterile dressings were applied.  She was brought to PACU in stable condition.  Needle and sponge counts correct.    PLAN:  The patient will be weightbearing as tolerated.  She will be admitted outpatient in a bed.  She was given Ancef 2 grams within an hour of the procedure, and this will be discontinued in 24 hours.  She will be started on Xarelto and pneumoboots for DVT prophylaxis with discharge in the morning.    Kai Miranda MD        D: 2022   T: 2022   MT: WILLIS    Name:     JUSTIN STYLES  MRN:      [id number]        Account:        [id number]   :       1955           Procedure Date: 08/30/2022     Document: [id number]    cc:  [facility]

## 2022-08-30 NOTE — ANESTHESIA POSTPROCEDURE EVALUATION
Patient: Tika Martinez    Procedure: Procedure(s):  Right total knee arthroplasty       Anesthesia Type:  General    Note:  Disposition: Admission   Postop Pain Control: Uneventful            Sign Out: Well controlled pain   PONV: No   Neuro/Psych: Uneventful            Sign Out: Acceptable/Baseline neuro status   Airway/Respiratory: Uneventful            Sign Out: Acceptable/Baseline resp. status   CV/Hemodynamics: Uneventful            Sign Out: Acceptable CV status; No obvious hypovolemia; No obvious fluid overload   Other NRE: NONE   DID A NON-ROUTINE EVENT OCCUR? No           Last vitals:  Vitals Value Taken Time   /75 08/30/22 1340   Temp 97.3  F (36.3  C) 08/30/22 1340   Pulse 76 08/30/22 1345   Resp 9 08/30/22 1345   SpO2 100 % 08/30/22 1343   Vitals shown include unvalidated device data.    Electronically Signed By: Rudy Whitley MD  August 30, 2022  2:08 PM

## 2022-08-30 NOTE — ANESTHESIA PROCEDURE NOTES
Saphenous Procedure Note    Pre-Procedure   Staff -        Anesthesiologist:  Rudy Whitley MD       Performed By: Anesthesiologist       Location: pre-op       Pre-Anesthestic Checklist: patient identified, IV checked, site marked, risks and benefits discussed, informed consent, monitors and equipment checked, pre-op evaluation, at physician/surgeon's request and post-op pain management  Timeout:       Correct Patient: Yes        Correct Procedure: Yes        Correct Site: Yes        Correct Position: Yes        Correct Laterality: Yes        Site Marked: Yes  Procedure Documentation  Procedure: Saphenous       Diagnosis: KNEE DJD       Laterality: right       Patient Position: supine       Patient Prep/Sterile Barriers: sterile gloves, mask       Skin prep: Betadine       Needle Type: short bevel and insulated       Needle Gauge: 21.        Needle Length (Inches): 4        Ultrasound guided       1. Ultrasound was used to identify targeted nerve, plexus, vascular marker, or fascial plane and place a needle adjacent to it in real-time.       2. Ultrasound was used to visualize the spread of anesthetic in close proximity to the above referenced structure.       3. A permanent image is entered into the patient's record.    Assessment/Narrative         The placement was negative for: blood aspirated, painful injection and site bleeding       Paresthesias: No.       Bolus given via needle..        Secured via.        Insertion/Infusion Method: Single Shot       Complications: none       Injection made incrementally with aspirations every 5 mL.     Comments:  The surgeon has given a verbal order transferring care of this patient to me for the performance of a regional analgesia block for post-op pain control. It is requested of me because I am uniquely trained and qualified to perform this block and the surgeon is neither trained nor qualified to perform this procedure.    20 ml 0.5% bupivacaine
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
## 2022-08-30 NOTE — ANESTHESIA CARE TRANSFER NOTE
Patient: Tika Martinez    Procedure: Procedure(s):  Right total knee arthroplasty       Diagnosis: DJD (degenerative joint disease) [M19.90]  Diagnosis Additional Information: No value filed.    Anesthesia Type:   General     Note:    Oropharynx: spontaneously breathing  Level of Consciousness: awake and drowsy  Oxygen Supplementation: nasal cannula  Level of Supplemental Oxygen (L/min / FiO2): 4l  Independent Airway: airway patency satisfactory and stable  Dentition: dentition unchanged  Vital Signs Stable: post-procedure vital signs reviewed and stable  Report to RN Given: handoff report given  Patient transferred to: PACU  Comments: Pt to PACU, ,VSS, report to RN  Handoff Report: Identifed the Patient, Identified the Reponsible Provider, Reviewed the pertinent medical history, Discussed the surgical course, Reviewed Intra-OP anesthesia mangement and issues during anesthesia, Set expectations for post-procedure period and Allowed opportunity for questions and acknowledgement of understanding      Vitals:  Vitals Value Taken Time   /81 08/30/22 1215   Temp     Pulse 83 08/30/22 1218   Resp 14 08/30/22 1218   SpO2 97 % 08/30/22 1218   Vitals shown include unvalidated device data.    Electronically Signed By: GERMAN Mcdonald CRNA  August 30, 2022  12:19 PM

## 2022-08-30 NOTE — PLAN OF CARE
Goal Outcome Evaluation:                    Admit pager paged at [phone] with this text  new surgical and is diabetic. pt has her own insulin pump and blood sugar monitor on. pt needs diabetic orders. thanks

## 2022-08-31 ENCOUNTER — APPOINTMENT (OUTPATIENT)
Dept: OCCUPATIONAL THERAPY | Facility: CLINIC | Age: 67
End: 2022-08-31
Attending: ORTHOPAEDIC SURGERY
Payer: MEDICARE

## 2022-08-31 ENCOUNTER — APPOINTMENT (OUTPATIENT)
Dept: PHYSICAL THERAPY | Facility: CLINIC | Age: 67
End: 2022-08-31
Attending: ORTHOPAEDIC SURGERY
Payer: MEDICARE

## 2022-08-31 VITALS
HEIGHT: 66 IN | SYSTOLIC BLOOD PRESSURE: 155 MMHG | BODY MASS INDEX: 31.18 KG/M2 | HEART RATE: 92 BPM | TEMPERATURE: 98.7 F | RESPIRATION RATE: 17 BRPM | DIASTOLIC BLOOD PRESSURE: 72 MMHG | OXYGEN SATURATION: 96 % | WEIGHT: 194 LBS

## 2022-08-31 LAB
FASTING STATUS PATIENT QL REPORTED: YES
GLUCOSE BLDC GLUCOMTR-MCNC: 191 MG/DL (ref 70–99)
GLUCOSE BLDC GLUCOMTR-MCNC: 201 MG/DL (ref 70–99)
GLUCOSE BLDC GLUCOMTR-MCNC: 211 MG/DL (ref 70–99)
GLUCOSE SERPL-MCNC: 231 MG/DL (ref 70–99)
HGB BLD-MCNC: 10.7 G/DL (ref 11.7–15.7)

## 2022-08-31 PROCEDURE — 97116 GAIT TRAINING THERAPY: CPT | Mod: GP | Performed by: PHYSICAL THERAPIST

## 2022-08-31 PROCEDURE — 99213 OFFICE O/P EST LOW 20 MIN: CPT | Performed by: STUDENT IN AN ORGANIZED HEALTH CARE EDUCATION/TRAINING PROGRAM

## 2022-08-31 PROCEDURE — 250N000013 HC RX MED GY IP 250 OP 250 PS 637: Performed by: ORTHOPAEDIC SURGERY

## 2022-08-31 PROCEDURE — 250N000013 HC RX MED GY IP 250 OP 250 PS 637: Performed by: HOSPITALIST

## 2022-08-31 PROCEDURE — 97530 THERAPEUTIC ACTIVITIES: CPT | Mod: GO | Performed by: REHABILITATION PRACTITIONER

## 2022-08-31 PROCEDURE — 258N000003 HC RX IP 258 OP 636: Performed by: ORTHOPAEDIC SURGERY

## 2022-08-31 PROCEDURE — 97165 OT EVAL LOW COMPLEX 30 MIN: CPT | Mod: GO | Performed by: REHABILITATION PRACTITIONER

## 2022-08-31 PROCEDURE — 36415 COLL VENOUS BLD VENIPUNCTURE: CPT | Performed by: ORTHOPAEDIC SURGERY

## 2022-08-31 PROCEDURE — 82962 GLUCOSE BLOOD TEST: CPT

## 2022-08-31 PROCEDURE — 97535 SELF CARE MNGMENT TRAINING: CPT | Mod: GO | Performed by: REHABILITATION PRACTITIONER

## 2022-08-31 PROCEDURE — 97110 THERAPEUTIC EXERCISES: CPT | Mod: GP | Performed by: PHYSICAL THERAPIST

## 2022-08-31 PROCEDURE — 250N000013 HC RX MED GY IP 250 OP 250 PS 637: Performed by: PHYSICIAN ASSISTANT

## 2022-08-31 PROCEDURE — 85018 HEMOGLOBIN: CPT | Performed by: ORTHOPAEDIC SURGERY

## 2022-08-31 PROCEDURE — 82947 ASSAY GLUCOSE BLOOD QUANT: CPT | Performed by: STUDENT IN AN ORGANIZED HEALTH CARE EDUCATION/TRAINING PROGRAM

## 2022-08-31 PROCEDURE — 250N000011 HC RX IP 250 OP 636: Performed by: ORTHOPAEDIC SURGERY

## 2022-08-31 PROCEDURE — 250N000013 HC RX MED GY IP 250 OP 250 PS 637: Performed by: INTERNAL MEDICINE

## 2022-08-31 RX ORDER — HYDROMORPHONE HYDROCHLORIDE 2 MG/1
2-4 TABLET ORAL
Qty: 31 TABLET | Refills: 0 | Status: SHIPPED | OUTPATIENT
Start: 2022-08-31

## 2022-08-31 RX ORDER — ACETAMINOPHEN 325 MG/1
975 TABLET ORAL EVERY 6 HOURS PRN
Status: DISCONTINUED | OUTPATIENT
Start: 2022-08-31 | End: 2022-08-31 | Stop reason: HOSPADM

## 2022-08-31 RX ORDER — HYDROMORPHONE HYDROCHLORIDE 2 MG/1
2-4 TABLET ORAL
Status: DISCONTINUED | OUTPATIENT
Start: 2022-08-31 | End: 2022-08-31 | Stop reason: HOSPADM

## 2022-08-31 RX ADMIN — CEFAZOLIN SODIUM 2 G: 2 INJECTION, SOLUTION INTRAVENOUS at 02:20

## 2022-08-31 RX ADMIN — OXYCODONE HYDROCHLORIDE 10 MG: 10 TABLET ORAL at 08:23

## 2022-08-31 RX ADMIN — ACETAMINOPHEN 975 MG: 325 TABLET, FILM COATED ORAL at 12:45

## 2022-08-31 RX ADMIN — MIDODRINE HYDROCHLORIDE 5 MG: 5 TABLET ORAL at 09:28

## 2022-08-31 RX ADMIN — CHOLESTYRAMINE 4 G: 4 POWDER, FOR SUSPENSION ORAL at 09:32

## 2022-08-31 RX ADMIN — HYDROXYZINE HYDROCHLORIDE 10 MG: 10 TABLET ORAL at 02:20

## 2022-08-31 RX ADMIN — RIVAROXABAN 10 MG: 10 TABLET, FILM COATED ORAL at 09:28

## 2022-08-31 RX ADMIN — HYDROMORPHONE HYDROCHLORIDE 2 MG: 2 TABLET ORAL at 09:54

## 2022-08-31 RX ADMIN — GABAPENTIN 300 MG: 300 CAPSULE ORAL at 09:28

## 2022-08-31 RX ADMIN — ONDANSETRON 4 MG: 2 INJECTION INTRAMUSCULAR; INTRAVENOUS at 07:45

## 2022-08-31 RX ADMIN — LISINOPRIL 2.5 MG: 2.5 TABLET ORAL at 09:28

## 2022-08-31 RX ADMIN — OXYCODONE HYDROCHLORIDE 10 MG: 10 TABLET ORAL at 02:20

## 2022-08-31 RX ADMIN — PANTOPRAZOLE SODIUM 20 MG: 20 TABLET, DELAYED RELEASE ORAL at 09:28

## 2022-08-31 RX ADMIN — HYDROXYZINE HYDROCHLORIDE 10 MG: 10 TABLET ORAL at 08:24

## 2022-08-31 RX ADMIN — POLYETHYLENE GLYCOL 3350 17 G: 17 POWDER, FOR SOLUTION ORAL at 09:36

## 2022-08-31 RX ADMIN — OXYBUTYNIN CHLORIDE 5 MG: 5 TABLET ORAL at 09:28

## 2022-08-31 RX ADMIN — SODIUM CHLORIDE, POTASSIUM CHLORIDE, SODIUM LACTATE AND CALCIUM CHLORIDE: 600; 310; 30; 20 INJECTION, SOLUTION INTRAVENOUS at 03:08

## 2022-08-31 RX ADMIN — HYDROMORPHONE HYDROCHLORIDE 2 MG: 2 TABLET ORAL at 12:46

## 2022-08-31 RX ADMIN — ACETAMINOPHEN 975 MG: 325 TABLET, FILM COATED ORAL at 04:52

## 2022-08-31 ASSESSMENT — ACTIVITIES OF DAILY LIVING (ADL)
ADLS_ACUITY_SCORE: 26
ADLS_ACUITY_SCORE: 22
ADLS_ACUITY_SCORE: 22

## 2022-08-31 NOTE — PLAN OF CARE
Goal Outcome Evaluation:    Plan of Care Reviewed With: patient, spouse     Overall Patient Progress: improving  Reviewed discharge instructions and medications with patient and spouse. Questions answered. Patient discharged to home with                  , discharge instructions, medications( please see discharge meds), and belongings.   Pt rated pain 10/10 this morning. New pain med(hydromorphone) given with relief.

## 2022-08-31 NOTE — PLAN OF CARE
Goal Outcome Evaluation:    Plan of Care Reviewed With: patient, spouse               Patient vital signs are at baseline: Yes  Patient able to ambulate as they were prior to admission or with assist devices provided by therapies during their stay:  No,  Reason:  needs help with leg back into bed and has to have the   KI on when OOB per PT  Patient MUST void prior to discharge:  Yes  Patient able to tolerate oral intake:  Yes  Pain has adequate pain control using Oral analgesics:  No,  Reason:  pt states pain decreases from a 10 to a 7-8 after oral oxycodone and IV dilaudid but her goal is a 4  Does patient have an identified :  Yes  Has goal D/C date and time been discussed with patient:  No,  Reason:  the date has but not the time of discharge.

## 2022-08-31 NOTE — PLAN OF CARE
Patient vital signs are at baseline: No,  Reason:  Temp 101.7 md notified tylenol given encouraged IS  Patient able to ambulate as they were prior to admission or with assist devices provided by therapies during their stay:  Yes  Patient MUST void prior to discharge:  Yes  Patient able to tolerate oral intake:  Yes  Pain has adequate pain control using Oral analgesics:  Yes  Does patient have an identified :  Yes   Has goal D/C date and time been discussed with patient:  Yes

## 2022-08-31 NOTE — PROGRESS NOTES
Occupational Therapy Discharge Summary    Reason for therapy discharge:    All goals and outcomes met, no further needs identified.    Progress towards therapy goal(s). See goals on Care Plan in [facility] electronic health record for goal details.  Goals met    Therapy recommendation(s):    No further therapy is recommended.

## 2022-08-31 NOTE — PROGRESS NOTES
08/31/22 0855   Quick Adds   Type of Visit Initial Occupational Therapy Evaluation   Living Environment   People in Home spouse   Current Living Arrangements apartment   Home Accessibility no concerns   Number of Stairs, Within Home, Primary none   Living Environment Comments no stairs; long hallways, tub shower combo, has elevated toilets   Self-Care   Usual Activity Tolerance moderate   Current Activity Tolerance fair   Regular Exercise Yes   Activity/Exercise Type walking   Exercise Amount/Frequency 3-5 times/wk   Equipment Currently Used at Home cane, straight;grab bar, tub/shower;raised toilet seat;shower chair;walker, rolling  (4ww)   Fall history within last six months no   Activity/Exercise/Self-Care Comment normally independent with mobility and cares,   Instrumental Activities of Daily Living (IADL)   IADL Comments spouse can A as needed for IADls and ADLs   General Information   Onset of Illness/Injury or Date of Surgery 08/30/22   Referring Physician Dr. Camarillo   Patient/Family Therapy Goal Statement (OT) to return home today   Additional Occupational Profile Info/Pertinent History of Current Problem Patient is POD #1 for R TKA   Existing Precautions/Restrictions fall  (EMELYN naranjo)   General Observations and Info patient was in chair, agreeable to OT EMELYN jiménez   Cognitive Status Examination   Orientation Status orientation to person, place and time   Visual Perception   Visual Impairment/Limitations corrective lenses full-time   Pain Assessment   Patient Currently in Pain Yes, see Vital Sign flowsheet  (rating pain 10/10, pain meds provided at start of session, declined offer to delay session)   Posture   Posture not impaired   Range of Motion Comprehensive   General Range of Motion bilateral upper extremity ROM WFL   Strength Comprehensive (MMT)   General Manual Muscle Testing (MMT) Assessment no strength deficits identified  (in B UE)   Muscle Tone Assessment   Muscle Tone Quick Adds No  deficits were identified   Coordination   Upper Extremity Coordination No deficits were identified   Bed Mobility   Comment (Bed Mobility) min A with lifting R LE into bed   Transfers   Transfer Comments CGA, fww sit<>stand   Balance   Balance Comments LOB was not noted, general unsteadiness to be expected   Activities of Daily Living   BADL Assessment/Intervention bathing;lower body dressing;toileting   Bathing Assessment/Intervention   [place] Level (Bathing) moderate assist (50% patient effort)   Comment, (Bathing) further education required to complete safely   Lower Body Dressing Assessment/Training   Comment, (Lower Body Dressing) further education required to complete safely   [place] Level (Lower Body Dressing) minimum assist (75% patient effort)   Toileting   Comment, (Toileting) not observed, patient reports she has been managing with RN staff, reports she feel she can complete at home, declined offer to assess   Clinical Impression   Criteria for Skilled Therapeutic Interventions Met (OT) Yes, treatment indicated   OT Diagnosis decreased ADLs/IADLs   OT Problem List-Impairments impacting ADL activity tolerance impaired;balance;pain;strength;range of motion (ROM)   Assessment of Occupational Performance 5 or more Performance Deficits   Identified Performance Deficits dsg, toileting, bathing, functional/community mobiltiy ,household chores, driving, errands   Planned Therapy Interventions (OT) ADL retraining;progressive activity/exercise;transfer training   Clinical Decision Making Complexity (OT) low complexity   Anticipated Equipment Needs Upon Discharge (OT)   (LHS and reacher)   Risk & Benefits of therapy have been explained evaluation/treatment results reviewed;care plan/treatment goals reviewed;risks/benefits reviewed;current/potential barriers reviewed;patient   OT Discharge Planning   OT Rationale for DC Rec defer to ortho team for dc plan- after OT intervention, patient has met needed  goals for safe discharge home with spouse support for ADL/IADls.   Total Evaluation Time (Minutes)   Total Evaluation Time (Minutes) 8   OT Goals   Therapy Frequency (OT) One time eval and treatment   OT Predicted Duration/Target Date for Goal Attainment 08/31/22   OT Goals Lower Body Dressing;Transfers;OT Goal 1;OT Goal 2   OT: Lower Body Dressing Minimal assist;Goal Met;Completed   OT: Transfer Minimal assist;with assistive device;Goal Met;Completed  (tub transfer)   OT: Goal 1 Patient will verbalize at least 2-3 adaptations to ADLs routines at home to accommodate energy level and safety needs.- goal met   OT: Goal 2 Patient will demonstrate safe use of walker, including transporting items and reaching during ADLs.- goal met

## 2022-08-31 NOTE — PROGRESS NOTES
[facility]  Hospitalist Progress Note  Brien Segovia, DO 08/31/22       Reason for Stay (Diagnosis):  Planned right total knee arthroplasty         Assessment and Plan:      Summary of Stay: Tika Martinez is a 67 year old female with a past medical history of diabetes mellitus on insulin pump, orthostatic hypotension, asthma, GERD, depression, hyperlipidemia, CKD and chronic pain on Subutex who presents with planned right total knee arthroplasty.    Patient underwent planned right total knee arthroplasty for right knee osteoarthritis on 8/30. Anticipate discharge today 8/31.      Problem List/Assessment and Plan:   Right knee osteoarthritis status post right total knee arthroplasty:   She came in 8/30 for planned right total knee arthroplasty for chronic osteoarthritis.  Estimated blood loss noted to be 50 cc.  No complications noted.  Pain better controlled today 8/31.   -Defer postoperative activity, DVT prophylaxis, pain management to primary team.  -Of note, patient is followed chronically through outpatient pain clinic who started her on buprenorphine about a month ago or so.  She has been instructed to hold her buprenorphine in the perioperative period and has started to do so since Sunday.  -Would continue to hold Suboxone and she should continue to follow with her outpatient pain team.  If pain management becomes difficult then may need to have our pain team evaluate the patient.     Diabetes mellitus 2, followed by endocrinology, on insulin pump:   She did attach her insulin pump to her left thigh and Dexcom is also in place.  She is adamant she wants these in place  as she gets better control of her blood sugar in this way.  She notes her blood sugars have gone very elevated if these are not in place.   -Continue with insulin pump  -Hypoglycemia protocol in place  -Patient is cognizant and alert and oriented.  She is comfortable managing her insulin pump.  Voices understanding of the  "signs and symptoms of hyperglycemia and hypoglycemia.     Orthostatic hypotension:   Patient notes this is been ongoing for 10 years.  She gets lightheaded to the point of presyncope upon standing up if she does not take midodrine therapy.  -We will continue midodrine therapy.     Asthma: No acute exacerbation.  Continue home inhalers.  Depression/anxiety: Continue her home gabapentin and Wellbutrin.  HL: Continue statin  CKD3: Preoperative creatinine 1.47.  Baseline seems to be between 1.3-1.5.  We will continue her low dose lisinopril in the setting of renal disease with hold parameters.  GERD: Continue PPI  Bladder spasm: Continue home oxybutynin  Gout: Continue allopurinol  RLS: Continue requip       DVT Prophylaxis: Defer to primary service  Code Status: Full Code  Discharge Dispo/Date: Defer to primary service. Stable to discharge from medical standpoint.        Interval History (Subjective):      Patient is feeling overall ok. Pain is present but she reports she is waiting for her pain meds to kick in. They are hoping to discharge home today.                   Physical Exam:      Last Vital Signs:  BP (!) 155/72   Pulse 92   Temp 99  F (37.2  C) (Temporal)   Resp 17   Ht 1.676 m (5' 6\")   Wt 88 kg (194 lb)   SpO2 94%   BMI 31.31 kg/m        Intake/Output Summary (Last 24 hours) at 8/31/2022 1244  Last data filed at 8/31/2022 0900  Gross per 24 hour   Intake 1800 ml   Output 1550 ml   Net 250 ml       General: Alert, awake, no acute distress.  HEENT: NC/AT, eyes anicteric, external occular movements intact, face symmetric.  Dentition WNL, MM moist.  Cardiac: RRR, S1, S2.  No murmurs appreciated.  Pulmonary: Normal work of breathing.  Lungs CTAB.  Abdomen: soft, non-tender, non-distended.  Bowel sounds present.  No guarding.  Extremities: no deformities.  Warm, well perfused.  Right knee in brace with ACE bandage extending to ankle.   Skin: no rashes or lesions noted.  Warm and dry.  Neuro: No gross " deficits noted.  Speech clear.    Psych: Appropriate affect.         Medications:      All current medications were reviewed with changes reflected in problem list.         Data:      All new lab and imaging data was reviewed.   Labs:       Lab Results   Component Value Date     10/03/2018     05/09/2018     03/20/2016    Lab Results   Component Value Date    CHLORIDE 108 10/03/2018    CHLORIDE 113 05/09/2018    CHLORIDE 115 03/20/2016    Lab Results   Component Value Date    BUN 20 10/03/2018    BUN 22 05/09/2018    BUN 13 03/20/2016      Lab Results   Component Value Date    POTASSIUM 3.9 10/03/2018    POTASSIUM 3.7 05/09/2018    POTASSIUM 3.9 03/20/2016    Lab Results   Component Value Date    CO2 24 10/03/2018    CO2 22 05/09/2018    CO2 24 03/20/2016    Lab Results   Component Value Date    CR 1.32 10/03/2018    CR 1.37 05/09/2018    CR 1.27 03/20/2016        Recent Labs   Lab 08/31/22  0735   HGB 10.7*      Imaging:   Recent Results (from the past 24 hour(s))   XR Knee Port Right 1/2 Views    Narrative    KNEE PORTABLE RIGHT ONE TO TWO VIEWS August 30, 2022 12:45 PM     INDICATION: Right knee postoperative follow-up.     COMPARISON: None.      Impression    IMPRESSION:  1.  Right total knee arthroplasty with patellar resurfacing. The  components are well seated without evidence of complication.  2.  No fractures or joint malalignment.  3.  Knee joint effusion. Postoperative intra-articular and soft tissue  gas.  4.  Anterior skin laura.    MAX HANCOCK MD         SYSTEM ID:  [id number]       Brien Segovia DO

## 2022-08-31 NOTE — CONSULTS
Care Management Discharge Note    Discharge Date: 08/31/2022       Discharge Disposition:      Discharge Services:      Discharge DME:      Discharge Transportation:      Private pay costs discussed: Not applicable    PAS Confirmation Code:    Patient/family educated on Medicare website which has current facility and service quality ratings:      Education Provided on the Discharge Plan:    Persons Notified of Discharge Plans:   Patient/Family in Agreement with the Plan:      Handoff Referral Completed: No    Additional Information:    Per chart review, pt will discharge home with assist from spouse. No SW needs identified. Please alert SW if needs arise.     SAUD Sifuentes, LGSW  Inpatient Care Coordination  Ortho/Spine Unit  [phone]  Skye Elder, KAUR

## 2022-08-31 NOTE — PLAN OF CARE
Physical Therapy Discharge Summary    Reason for therapy discharge:    All goals and outcomes met, no further needs identified.    Progress towards therapy goal(s). See goals on Care Plan in [facility] electronic health record for goal details.  Goals met    Therapy recommendation(s):    Defer to ortho; will need KI with ambulation  at this time

## 2022-08-31 NOTE — PROGRESS NOTES
[facility]    Orthopedics Progress Note     Assessment & Plan   Tika Martinez is a 67 year old female who was admitted on 8/30/2022 for Right TKA. Patient is struggling with pain and feels like the Oxycodone is not working. She will be changed to oral Dilaudid. Therapy today and then discharge home with spouse. Follow-up in two weeks. ACE wrap can be removed today.     Disposition Plan   Expected discharge: Today, recommended to prior living arrangement once therapy is completed.     Entered: PAMELA Jordan 08/31/2022, 8:26 AM   Information in the above section will display in the discharge planner report.    PAMELA Jordan    Interval History   No major events overnight.     Physical Exam   Temp: 99  F (37.2  C) Temp src: Temporal BP: (!) 155/72 Pulse: 92   Resp: 17 SpO2: 94 % O2 Device: Nasal cannula Oxygen Delivery: 2 LPM  Vitals:    08/16/22 1400 08/30/22 0814   Weight: 88 kg (194 lb) 88 kg (194 lb)     Vital Signs with Ranges  Temp:  [96.8  F (36  C)-101.7  F (38.7  C)] 99  F (37.2  C)  Pulse:  [] 92  Resp:  [6-30] 17  BP: (115-169)/(53-91) 155/72  SpO2:  [92 %-100 %] 94 %  I/O last 3 completed shifts:  In: 1735 [P.O.:100; I.V.:1635]  Out: 1150 [Urine:1100; Blood:50]    Dressing c/d/i. Calves soft. NV intact distally in foot and ankle.     Medications     insulin basal rate for inpatient ambulatory pump       lactated ringers 125 mL/hr at 08/31/22 0308       allopurinol  200 mg Oral At Bedtime     atorvastatin  40 mg Oral At Bedtime     beclomethasone HFA  1 puff Inhalation BID     buPROPion  450 mg Oral QPM     cholestyramine  4 g Oral BID w/meals     gabapentin  300 mg Oral TID     insulin aspart   Device See Admin Instructions     insulin bolus from AMBULATORY PUMP   Subcutaneous 4x Daily AC & HS     insulin bolus from AMBULATORY PUMP   Subcutaneous TID AC     lisinopril  2.5 mg Oral Daily     midodrine  5 mg Oral TID     mirtazapine  45 mg Oral At  Bedtime     montelukast  10 mg Oral At Bedtime     oxybutynin  5 mg Oral TID     pantoprazole  20 mg Oral QAM AC     polyethylene glycol  17 g Oral Daily     rivaroxaban ANTICOAGULANT  10 mg Oral Daily     rOPINIRole  1.5 mg Oral At Bedtime     senna-docusate  1 tablet Oral BID     sodium chloride (PF)  3 mL Intracatheter Q8H       Data   Recent Labs   Lab 08/31/22  0735 08/31/22  0313 08/31/22  0205   HGB 10.7*  --   --    * 201* 191*

## 2022-09-06 NOTE — DISCHARGE SUMMARY
Service Date: 08/31/2022  Discharge Date: 08/31/2022    ADMIT DIAGNOSIS:  Osteoarthrosis of the right knee.    DISCHARGE DIAGNOSIS:  Status post right total knee arthroplasty.    PROCEDURE:  Right total knee arthroplasty performed by Dr. Kai Miranda at [facility] on 08/30/2022.    INDICATIONS FOR ADMISSION:  The patient was admitted for postoperative observation cares and pain control following her total knee replacement.    HOSPITAL COURSE:  On 08/30/2022, the patient underwent a right total knee arthroplasty at [facility].  She tolerated the procedure well and was transferred up to the medical surgical floor in stable condition.  During her stay, her vital signs remained stable.  Her hemoglobin dropped to a low of 10.7 postoperatively and she required no blood transfusions.  Physical and Occupational Therapy were consulted and at her time of discharge, she was ambulating well with a front-wheeled walker.  DVT and PE prophylaxis included early ambulation, lower extremity compression devices, lower extremity pneumatic devices and oral Xarelto.  Incentive spirometry was utilized for pneumonia prophylaxis.  On 08/31/2022, the patient was in stable condition.  She was discharged home.    DISCHARGE INSTRUCTIONS:  On 08/31/2022, the patient was discharged home with the following instructions:  Weightbearing as tolerates, ice to the knee p.r.n. pain and swelling, keep incision clean and dry and continue with physical therapy per total knee arthroplasty protocol.    DISCHARGE MEDICATIONS:  Medications at time of discharge included hydromorphone 2 mg to 4 mg by mouth every 3 hours as needed for moderate to severe pain, Tylenol per the patient's home dosing and Xarelto 10 mg by mouth daily for 14 days.    FOLLOWUP:  This patient will follow up at [facility] in 2 weeks.    Kai Miranda MD    As Dictated by CARIN CLARK PA-C        D: 09/06/2022   T: 09/06/2022   MT:  ETREMT    Name:     JUSTIN STYLES  MRN:      [id number]        Account:      [id number]   :      1955           Service Date: 2022                                  Discharge Date: 2022     Document: [id number]

## (undated) DEVICE — PREP CHLORAPREP 26ML TINTED HI-LITE ORANGE 930815

## (undated) DEVICE — SUCTION MANIFOLD NEPTUNE 2 SYS 4 PORT 0702-020-000

## (undated) DEVICE — LINEN ORTHO ACL PACK 5447

## (undated) DEVICE — GLOVE PROTEXIS POWDER FREE 8.5 ORTHOPEDIC 2D73ET85

## (undated) DEVICE — DRSG GAUZE 4X4" 8044

## (undated) DEVICE — ESU PENCIL W/SMOKE EVAC CVPLP2000

## (undated) DEVICE — DRAPE STERI INCISE 1050

## (undated) DEVICE — PREP CHLORAPREP 26ML TINTED ORANGE  260815

## (undated) DEVICE — DRAPE STERI TOWEL LG 1010

## (undated) DEVICE — TOURNIQUET SGL BLADDER 34"X4" PURPLE 5921-034-135

## (undated) DEVICE — SU VICRYL 1 CT-1 27" J341H

## (undated) DEVICE — GLOVE PROTEXIS POWDER FREE 7.5 ORTHOPEDIC 2D73ET75

## (undated) DEVICE — Device

## (undated) DEVICE — BONE CEMENT MIXEVAC III HI VAC KIT  0206-015-000

## (undated) DEVICE — SOL NACL 0.9% IRRIG 3000ML BAG 2B7477

## (undated) DEVICE — SOL NACL 0.9% INJ 1000ML BAG 2B1324X

## (undated) DEVICE — GLOVE PROTEXIS POWDER FREE 8.0 ORTHOPEDIC 2D73ET80

## (undated) DEVICE — BLADE SAW RECIP STRK LONG 70X12.5X0.9MM 0277-096-278

## (undated) DEVICE — IMM KNEE 20"

## (undated) DEVICE — GLOVE PROTEXIS W/NEU-THERA 8.5  2D73TE85

## (undated) DEVICE — SET HANDPIECE INTERPULSE W/COAXIAL FAN SPRAY TIP 0210118000

## (undated) DEVICE — BAG CLEAR TRASH 1.3M 39X33" P4040C

## (undated) DEVICE — BLADE SAW SAGITTAL STRK 25X90X1.27MM HD SYS 6 6125-127-090

## (undated) DEVICE — CAST PADDING 6" STERILE 9046S

## (undated) DEVICE — PACK TOTAL KNEE BOXED LATEX FREE PO15TKFCT

## (undated) DEVICE — DECANTER VIAL 2006S

## (undated) DEVICE — BLADE SAW SAGITTAL STRK 13X90X1.27MM HD SYS 6 6113-127-090

## (undated) DEVICE — CLEANSER WOUND IRRISEPT 0.05% CHG IRRISEPT-403

## (undated) DEVICE — LINEN FULL SHEET 5511

## (undated) DEVICE — SU VICRYL 2-0 CT-1 27" UND J259H

## (undated) DEVICE — DRSG AQUACEL AG HYDROFIBER  3.5X10" 422605

## (undated) DEVICE — GLOVE PROTEXIS BLUE W/NEU-THERA 8.5  2D73EB85

## (undated) DEVICE — TOURNIQUET CUFF 30" REPRO BLUE 60-7070-105

## (undated) DEVICE — DRSG AQUACEL AG 3.5X9.75" HYDROFIBER 412011

## (undated) DEVICE — SOL WATER IRRIG 1000ML BOTTLE 2F7114

## (undated) DEVICE — CLEANSER JET LAVAGE IRRISEPT 0.05% CHG IRRISEPT45USA

## (undated) DEVICE — DRAIN HEMOVAC RESERVOIR KIT 10FR 1/8" MED 00-2550-002-10

## (undated) RX ORDER — LIDOCAINE HYDROCHLORIDE 10 MG/ML
INJECTION, SOLUTION EPIDURAL; INFILTRATION; INTRACAUDAL; PERINEURAL
Status: DISPENSED
Start: 2022-08-30

## (undated) RX ORDER — CEFAZOLIN SODIUM 2 G/100ML
INJECTION, SOLUTION INTRAVENOUS
Status: DISPENSED
Start: 2018-10-02

## (undated) RX ORDER — HYDROMORPHONE HYDROCHLORIDE 1 MG/ML
INJECTION, SOLUTION INTRAMUSCULAR; INTRAVENOUS; SUBCUTANEOUS
Status: DISPENSED
Start: 2018-10-02

## (undated) RX ORDER — OXYCODONE HYDROCHLORIDE 5 MG/1
TABLET ORAL
Status: DISPENSED
Start: 2018-10-02

## (undated) RX ORDER — FENTANYL CITRATE 50 UG/ML
INJECTION, SOLUTION INTRAMUSCULAR; INTRAVENOUS
Status: DISPENSED
Start: 2018-10-02

## (undated) RX ORDER — PROPOFOL 10 MG/ML
INJECTION, EMULSION INTRAVENOUS
Status: DISPENSED
Start: 2022-08-30

## (undated) RX ORDER — CEFAZOLIN SODIUM/WATER 2 G/20 ML
SYRINGE (ML) INTRAVENOUS
Status: DISPENSED
Start: 2022-08-30

## (undated) RX ORDER — GLYCOPYRROLATE 0.2 MG/ML
INJECTION INTRAMUSCULAR; INTRAVENOUS
Status: DISPENSED
Start: 2022-08-30

## (undated) RX ORDER — FENTANYL CITRATE 50 UG/ML
INJECTION, SOLUTION INTRAMUSCULAR; INTRAVENOUS
Status: DISPENSED
Start: 2022-08-30

## (undated) RX ORDER — PROPOFOL 10 MG/ML
INJECTION, EMULSION INTRAVENOUS
Status: DISPENSED
Start: 2018-10-02

## (undated) RX ORDER — NEOSTIGMINE METHYLSULFATE 1 MG/ML
VIAL (ML) INJECTION
Status: DISPENSED
Start: 2022-08-30

## (undated) RX ORDER — LIDOCAINE HYDROCHLORIDE 10 MG/ML
INJECTION, SOLUTION EPIDURAL; INFILTRATION; INTRACAUDAL; PERINEURAL
Status: DISPENSED
Start: 2018-10-02

## (undated) RX ORDER — BUPIVACAINE HYDROCHLORIDE AND EPINEPHRINE 2.5; 5 MG/ML; UG/ML
INJECTION, SOLUTION EPIDURAL; INFILTRATION; INTRACAUDAL; PERINEURAL
Status: DISPENSED
Start: 2018-10-02

## (undated) RX ORDER — HYDROXYZINE HYDROCHLORIDE 10 MG/1
TABLET, FILM COATED ORAL
Status: DISPENSED
Start: 2022-08-30

## (undated) RX ORDER — VANCOMYCIN HYDROCHLORIDE 1 G/20ML
INJECTION, POWDER, LYOPHILIZED, FOR SOLUTION INTRAVENOUS
Status: DISPENSED
Start: 2022-08-30

## (undated) RX ORDER — HYDROMORPHONE HCL IN WATER/PF 6 MG/30 ML
PATIENT CONTROLLED ANALGESIA SYRINGE INTRAVENOUS
Status: DISPENSED
Start: 2022-08-30

## (undated) RX ORDER — ACETAMINOPHEN 325 MG/1
TABLET ORAL
Status: DISPENSED
Start: 2018-10-02

## (undated) RX ORDER — TRANEXAMIC ACID 650 MG/1
TABLET ORAL
Status: DISPENSED
Start: 2022-08-30

## (undated) RX ORDER — ONDANSETRON 2 MG/ML
INJECTION INTRAMUSCULAR; INTRAVENOUS
Status: DISPENSED
Start: 2022-08-30

## (undated) RX ORDER — GLYCOPYRROLATE 0.2 MG/ML
INJECTION INTRAMUSCULAR; INTRAVENOUS
Status: DISPENSED
Start: 2018-10-02

## (undated) RX ORDER — OXYCODONE HYDROCHLORIDE 5 MG/1
TABLET ORAL
Status: DISPENSED
Start: 2022-08-30

## (undated) RX ORDER — DEXAMETHASONE SODIUM PHOSPHATE 4 MG/ML
INJECTION, SOLUTION INTRA-ARTICULAR; INTRALESIONAL; INTRAMUSCULAR; INTRAVENOUS; SOFT TISSUE
Status: DISPENSED
Start: 2018-10-02

## (undated) RX ORDER — SCOLOPAMINE TRANSDERMAL SYSTEM 1 MG/1
PATCH, EXTENDED RELEASE TRANSDERMAL
Status: DISPENSED
Start: 2018-10-02

## (undated) RX ORDER — BUPIVACAINE HYDROCHLORIDE 2.5 MG/ML
INJECTION, SOLUTION EPIDURAL; INFILTRATION; INTRACAUDAL
Status: DISPENSED
Start: 2022-08-30

## (undated) RX ORDER — ONDANSETRON 2 MG/ML
INJECTION INTRAMUSCULAR; INTRAVENOUS
Status: DISPENSED
Start: 2018-10-02